# Patient Record
Sex: MALE | Race: BLACK OR AFRICAN AMERICAN | Employment: FULL TIME | URBAN - METROPOLITAN AREA
[De-identification: names, ages, dates, MRNs, and addresses within clinical notes are randomized per-mention and may not be internally consistent; named-entity substitution may affect disease eponyms.]

---

## 2022-03-09 ENCOUNTER — OFFICE VISIT (OUTPATIENT)
Dept: VASCULAR SURGERY | Age: 52
End: 2022-03-09
Payer: COMMERCIAL

## 2022-03-09 VITALS
HEART RATE: 87 BPM | OXYGEN SATURATION: 99 % | BODY MASS INDEX: 19.6 KG/M2 | SYSTOLIC BLOOD PRESSURE: 160 MMHG | HEIGHT: 71 IN | DIASTOLIC BLOOD PRESSURE: 92 MMHG | WEIGHT: 140 LBS

## 2022-03-09 DIAGNOSIS — I82.401 ACUTE THROMBOEMBOLISM OF DEEP VEINS OF RIGHT LOWER EXTREMITY (HCC): Primary | ICD-10-CM

## 2022-03-09 PROCEDURE — 99203 OFFICE O/P NEW LOW 30 MIN: CPT | Performed by: NURSE PRACTITIONER

## 2022-03-09 RX ORDER — AMLODIPINE BESYLATE 5 MG/1
5 TABLET ORAL DAILY
COMMUNITY
Start: 2022-03-04

## 2022-03-09 RX ORDER — RIVAROXABAN 20 MG/1
20 TABLET, FILM COATED ORAL DAILY
COMMUNITY
Start: 2022-03-04 | End: 2022-09-12

## 2022-03-09 RX ORDER — RIVAROXABAN 15 MG-20MG
KIT ORAL
COMMUNITY
Start: 2022-02-13 | End: 2022-09-12 | Stop reason: SDUPTHER

## 2022-03-09 NOTE — PROGRESS NOTES
Chief Complaint   Patient presents with    New Patient    Blood Clot         Impression and Plan:  47 y.o. male with an unprovoked right lower extremity DVT. Patient advised to use warm compress and ibuprofen along superficial thrombus to help alleviate discomfort. Refer to hematology as he has a DVT of unknown origin. RTO in 2 months a repeat DVT rule out PVL      History and Physical    Jossie Hawthorne is a 47y.o. 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 After failing the pain he worked a 15-hour shift standing at the [employer]. The pain went on for several days until it became unbearable where he entered the ED. The majority of his thrombus was in his greatness vein and remained superficial however while he was supine a portion of it extended past the saphenofemoral junction. It does retract with standing. But since it is close to the deep system he has been placed on Xarelto. He denies any traumatic event, injury, stretch injury, long trip and hormone therapy. He denies a family history of DVT and he denies a previous DVT. No past medical history on file. There is no problem list on file for this patient. No past surgical history on file. Current Outpatient Medications   Medication Sig Dispense Refill    amLODIPine (NORVASC) 5 mg tablet Take 5 mg by mouth daily.  Xarelto DVT-PE Treat 30d Start 15 mg (42)- 20 mg (9) DsPk TAKE BY MOUTH AS DIRECTED ON PACKAGE FOR 30 DAYS      Xarelto 20 mg tab tablet Take 20 mg by mouth daily.        Allergies   Allergen Reactions    Iodinated Contrast Media Other (comments)     Social History     Socioeconomic History    Marital status:      Spouse name: Not on file    Number of children: Not on file    Years of education: Not on file    Highest education level: Not on file   Occupational History    Not on file   Tobacco Use    Smoking status: Not on file    Smokeless tobacco: Not on file   Substance and Sexual Activity    Alcohol use: Not on file    Drug use: Not on file    Sexual activity: Not on file   Other Topics Concern    Not on file   Social History Narrative    Not on file     Social Determinants of Health     Financial Resource Strain:     Difficulty of Paying Living Expenses: Not on file   Food Insecurity:     Worried About Running Out of Food in the Last Year: Not on file    Jj of Food in the Last Year: Not on file   Transportation Needs:     Lack of Transportation (Medical): Not on file    Lack of Transportation (Non-Medical): Not on file   Physical Activity:     Days of Exercise per Week: Not on file    Minutes of Exercise per Session: Not on file   Stress:     Feeling of Stress : Not on file   Social Connections:     Frequency of Communication with Friends and Family: Not on file    Frequency of Social Gatherings with Friends and Family: Not on file    Attends [employer] Services: Not on file    Active Member of [address] [place] [address] or Organizations: Not on file    Attends Club or Organization Meetings: Not on file    Marital Status: Not on file   Intimate Partner Violence:     Fear of Current or Ex-Partner: Not on file    Emotionally Abused: Not on file    Physically Abused: Not on file    Sexually Abused: Not on file   Housing Stability:     Unable to Pay for Housing in the Last Year: Not on file    Number of [place] in the Last Year: Not on file    Unstable Housing in the Last Year: Not on file      No family history on file.     Review of Systems    General: negative for fever   Eyes: negative for vision loss   HENT: negative for cold symptoms   Respiratory negative for shortness of breath   Cardiac: negative for chest pain   Vascular negative for foot pain at night    Gastrointestinal: negative for abdominal pain   Genitourinary: negative for dysuria    Endocrine: negative for excessive thirst   Skin: negative for rash   Neurological: negative for paralysis   Psychiatric: negative for depression          Physical Exam:    Visit Vitals  Ht 5' 11\" (1.803 m)   Wt 140 lb (63.5 kg)   BMI 19.53 kg/m²      Constitutional:  Patient is well developed, well nourished, and not distressed. HEENT: atraumatic, normocephalic, wearing a mask. Eyes:   Cunjunctivae clear, no scleral icterus  Neck:   No JVD present. Cardiovascular:  Normal rate, regular rhythm, normal heart sounds. No murmur heard. Pulmonary/Chest: Effort normal .  Extremities: Normal range of motion. No edema  Neurological:  he  is alert and oriented x3 . Gait normal. Motor & sensory grossly intact in all 4 limbs. Psych: Appropriate mood and affect. Skin:  Skin is warm and dry. No ulcerations  Pulses: Palpable pedal pulses        The treatment plan was reviewed with the patient in detail. The patient voiced understanding of this plan and all questions and concerns were addressed. The patient agrees with this plan. We discussed the signs and symptoms that would require earlier attention or intervention. I appreciate the opportunity to participate in the care of your patient. I will be sure to keep you informed of any subsequent changes in the treatment plan. If you have any questions or concerns, please feel free to contact me.       [facility]  Vascular Nurse [address]  [phone]

## 2022-03-09 NOTE — PROGRESS NOTES
1. Have you been to an emergency room or urgent care clinic since your last visit? Yes, [facility]     Hospitalized since your last visit? If yes, where, when, and reason for visit? No  2. Have you seen or consulted any other health care providers outside of the [facility] since your last visit including any procedures, health maintenance items. If yes, where, when and reason for visit?  Yes

## 2022-05-11 ENCOUNTER — OFFICE VISIT (OUTPATIENT)
Dept: VASCULAR SURGERY | Age: 52
End: 2022-05-11
Payer: COMMERCIAL

## 2022-05-11 VITALS
SYSTOLIC BLOOD PRESSURE: 126 MMHG | OXYGEN SATURATION: 98 % | HEART RATE: 96 BPM | RESPIRATION RATE: 20 BRPM | DIASTOLIC BLOOD PRESSURE: 88 MMHG

## 2022-05-11 DIAGNOSIS — I82.5Y1 CHRONIC VENOUS EMBOLISM AND THROMBOSIS OF DEEP VESSELS OF PROXIMAL END OF RIGHT LOWER EXTREMITY (HCC): Primary | ICD-10-CM

## 2022-05-11 PROCEDURE — 99213 OFFICE O/P EST LOW 20 MIN: CPT | Performed by: NURSE PRACTITIONER

## 2022-05-11 NOTE — PROGRESS NOTES
Chief Complaint   Patient presents with    Blood Clot         Impression and Plan:  47 y.o. male with an unprovoked right lower extremity DVT. Pt is scheduled to see hematology next week He was advised to continue with anticoagulation until he sees a hematologist.  Lj Gonzalez as needed      History and Physical    Dora Persaud is a 47y.o. year old male  a history of alcohol induced pancreatitis ,pancreatomy and splenectomy in 2018 returns to the office for his 2 month follow up concerning an unprovoked DVT. He notes marked improvement in his right thigh and leg pain. He denies edema. He has been compliant with his anticoagulant and will follow-up with hematology next week. His venous rule out indicates a Chronic occlusive thrombus in the right great saphenous vein >5 cm from the deep system, the thrombus remains occlusive and extends to the below knee segment. There is an area of partial reconstitution at the distal thigh segment. 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 After feeling the pain he worked a 15-hour shift standing at the [employer]. The pain went on for several days until it became unbearable where he entered the ED. The majority of his thrombus was in his GSV and remained superficial however while he was supine a portion of it extended past the saphenofemoral junction. It does retract with standing. But since it is close to the deep system he has been placed on Xarelto. He denies any traumatic event, injury, stretch injury, long trip and hormone therapy. He denies a family history of DVT and he denies a previous DVT. Past Medical History:   Diagnosis Date    Hypertension     Pancreatitis     Thromboembolus ([facility] [address]      There is no problem list on file for this patient. No past surgical history on file.   Current Outpatient Medications   Medication Sig Dispense Refill    amLODIPine (NORVASC) 5 mg tablet Take 5 mg by mouth daily.  Xarelto DVT-PE Treat 30d Start 15 mg (42)- 20 mg (9) DsPk TAKE BY MOUTH AS DIRECTED ON PACKAGE FOR 30 DAYS      Xarelto 20 mg tab tablet Take 20 mg by mouth daily. Allergies   Allergen Reactions    Iodinated Contrast Media Other (comments)     Social History     Socioeconomic History    Marital status:      Spouse name: Not on file    Number of children: Not on file    Years of education: Not on file    Highest education level: Not on file   Occupational History    Not on file   Tobacco Use    Smoking status: Current Every Day Smoker     Types: Cigarettes    Smokeless tobacco: Never Used   Vaping Use    Vaping Use: Former   Substance and Sexual Activity    Alcohol use: Yes     Alcohol/week: 14.0 standard drinks     Types: 14 Shots of liquor per week    Drug use: Never    Sexual activity: Not on file   Other Topics Concern    Not on file   Social History Narrative    Not on file     Social Determinants of Health     Financial Resource Strain:     Difficulty of Paying Living Expenses: Not on file   Food Insecurity:     Worried About Running Out of Food in the Last Year: Not on file    Jj of Food in the Last Year: Not on file   Transportation Needs:     Lack of Transportation (Medical): Not on file    Lack of Transportation (Non-Medical):  Not on file   Physical Activity:     Days of Exercise per Week: Not on file    Minutes of Exercise per Session: Not on file   Stress:     Feeling of Stress : Not on file   Social Connections:     Frequency of Communication with Friends and Family: Not on file    Frequency of Social Gatherings with Friends and Family: Not on file    Attends [employer] Services: Not on file    Active Member of Clubs or Organizations: Not on file    Attends Club or Organization Meetings: Not on file    Marital Status: Not on file   Intimate Partner Violence:     Fear of Current or Ex-Partner: Not on file  Emotionally Abused: Not on file    Physically Abused: Not on file    Sexually Abused: Not on file   Housing Stability:     Unable to Pay for Housing in the Last Year: Not on file    Number of Places Lived in the Last Year: Not on file    Unstable Housing in the Last Year: Not on file      Family History   Problem Relation Age of Onset    Cancer Father     Diabetes Maternal Uncle        Review of Systems    General: negative for fever   Eyes: negative for vision loss   HENT: negative for cold symptoms   Respiratory negative for shortness of breath   Cardiac: negative for chest pain   Vascular negative for foot pain at night    Gastrointestinal: negative for abdominal pain   Genitourinary: negative for dysuria    Endocrine: negative for excessive thirst   Skin: negative for rash   Neurological: negative for paralysis   Psychiatric: negative for depression          Physical Exam:    There were no vitals taken for this visit. Constitutional:  Patient is well developed, well nourished, and not distressed. HEENT: atraumatic, normocephalic, wearing a mask. Eyes:   Cunjunctivae clear, no scleral icterus  Neck:   No JVD present. Cardiovascular:  Normal rate, regular rhythm, normal heart sounds. No murmur heard. Pulmonary/Chest: Effort normal .  Extremities: Normal range of motion. No edema  Neurological:  he  is alert and oriented x3 . Gait normal. Motor & sensory grossly intact in all 4 limbs. Psych: Appropriate mood and affect. Skin:  Skin is warm and dry. No ulcerations  Pulses: Palpable pedal pulses        The treatment plan was reviewed with the patient in detail. The patient voiced understanding of this plan and all questions and concerns were addressed. The patient agrees with this plan. We discussed the signs and symptoms that would require earlier attention or intervention. I appreciate the opportunity to participate in the care of your patient.   I will be sure to keep you informed of any subsequent changes in the treatment plan. If you have any questions or concerns, please feel free to contact me.       Randy Cheng [facility]  Vascular Nurse [address]  [phone]

## 2022-05-11 NOTE — PROGRESS NOTES
1. Have you been to an emergency room or urgent care clinic since your last visit? No     Hospitalized since your last visit? If yes, where, when, and reason for visit? No     2. Have you seen or consulted any other health care providers outside of the [facility] since your last visit including any procedures, health maintenance items. If yes, where, when and reason for visit?  Yes ; pcp         3 most recent PHQ Screens 5/11/2022   Little interest or pleasure in doing things Not at all   Feeling down, depressed, irritable, or hopeless Not at all   Total Score PHQ 2 0

## 2022-05-17 ENCOUNTER — OFFICE VISIT (OUTPATIENT)
Dept: ONCOLOGY | Age: 52
End: 2022-05-17
Payer: COMMERCIAL

## 2022-05-17 VITALS
OXYGEN SATURATION: 100 % | WEIGHT: 136 LBS | HEIGHT: 71 IN | SYSTOLIC BLOOD PRESSURE: 155 MMHG | DIASTOLIC BLOOD PRESSURE: 117 MMHG | BODY MASS INDEX: 19.04 KG/M2 | RESPIRATION RATE: 18 BRPM | HEART RATE: 76 BPM

## 2022-05-17 DIAGNOSIS — D75.838 THROMBOCYTOSIS AFTER SPLENECTOMY: ICD-10-CM

## 2022-05-17 DIAGNOSIS — I82.401 ACUTE DEEP VEIN THROMBOSIS (DVT) OF RIGHT LOWER EXTREMITY, UNSPECIFIED VEIN (HCC): Primary | ICD-10-CM

## 2022-05-17 DIAGNOSIS — Z90.81 THROMBOCYTOSIS AFTER SPLENECTOMY: ICD-10-CM

## 2022-05-17 PROCEDURE — 99204 OFFICE O/P NEW MOD 45 MIN: CPT | Performed by: INTERNAL MEDICINE

## 2022-05-17 RX ORDER — DOCUSATE SODIUM 100 MG/1
100 CAPSULE, LIQUID FILLED ORAL DAILY PRN
COMMUNITY

## 2022-05-17 RX ORDER — SILDENAFIL 100 MG/1
TABLET, FILM COATED ORAL
COMMUNITY
Start: 2022-03-04

## 2022-05-17 NOTE — PROGRESS NOTES
Hematology/Oncology Consultation Note      Date: 2022    Name: Melchor Loza  : 1970        Annalee Hastings MD         Subjective:     Chief complaint: Right Lower extremity DVT    History of Present Illness:   Mr. Nany Post is a most pleasant 47y.o. year old male who was seen for consultation of Right Lower extremity DVT. The patient has a past medical history significant of alcohol induced pancreatitis, pancreatomy and splenectomy in 2018. 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 The pain went on for several days until it became unbearable where he entered the ED.   -- 2022 Venous doppler reported acute, non-occlusive deep venous thrombosis in the right common femoral vein at the saphenofemoral junction extending from the great saphenous vein. Acute, occlusive superficial venous thrombosis in the right great saphenous vein at the level of the saphenofemoral junction to the mid calf. -- The majority of his thrombus was in his GSV and remained superficial however while he was supine a portion of it extended past the saphenofemoral junction. It does retract with standing. But since it is close to the deep system he has been placed on Xarelto.   The patient denied any traumatic event, injury, stretch injury, long trip and hormone therapy. He denies a family history of DVT and he denies a previous DVT. -- 2022 Venous Doppler reported Chronic occlusive thrombus in the right great saphenous vein >5 cm from the deep system, the thrombus remains occlusive and extends to the below knee segment. There is an area of partial reconstitution at the distal thigh segment. -- The patient reported significant improvement in his right thigh and leg pain. He denies edema. He has been compliant with his anticoagulant Xarelto. The patient otherwise has no other complaints.  Denied fever, chills, night sweat, unintentional weight loss, skin lumps or bumps, acute bleeding or bruising issues. No acute bleeding, blood in stool, dark stool, melena, hematochezia, hemoptysis, dark urine, or easily bruising. Denied headache, acute vision change, dizziness, chest pain, worsen shortness of breath, palpitation, productive cough, nausea, vomiting, abdominal pain, altered bowel habits, dysuria, worsen bone pain or back pain, new focal numbness or weakness. Past Medical History, Family History, and Social History:    Past Medical History:   Diagnosis Date    Hypertension     Pancreatitis     Thromboembolus ([facility] [address]      Past Surgical History:   Procedure Laterality Date    HX APPENDECTOMY      HX GI       Social History     Socioeconomic History    Marital status:      Spouse name: Not on file    Number of children: Not on file    Years of education: Not on file    Highest education level: Not on file   Occupational History    Not on file   Tobacco Use    Smoking status: Current Every Day Smoker     Years: 6.00     Types: Cigarettes    Smokeless tobacco: Never Used   Vaping Use    Vaping Use: Former   Substance and Sexual Activity    Alcohol use: Yes     Alcohol/week: 14.0 standard drinks     Types: 14 Shots of liquor per week    Drug use: Never    Sexual activity: Not on file   Other Topics Concern    Not on file   Social History Narrative    Not on file     Social Determinants of Health     Financial Resource Strain:     Difficulty of Paying Living Expenses: Not on file   Food Insecurity:     Worried About Running Out of Food in the Last Year: Not on file    Jj of Food in the Last Year: Not on file   Transportation Needs:     Lack of Transportation (Medical): Not on file    Lack of Transportation (Non-Medical):  Not on file   Physical Activity:     Days of Exercise per Week: Not on file    Minutes of Exercise per Session: Not on file   Stress:     Feeling of Stress : Not on file   Social Connections:     Frequency of Communication with Friends and Family: Not on file    Frequency of Social Gatherings with Friends and Family: Not on file    Attends Religion Services: Not on file    Active Member of Clubs or Organizations: Not on file    Attends Club or Organization Meetings: Not on file    Marital Status: Not on file   Intimate Partner Violence:     Fear of Current or Ex-Partner: Not on file    Emotionally Abused: Not on file    Physically Abused: Not on file    Sexually Abused: Not on file   Housing Stability:     Unable to Pay for Housing in the Last Year: Not on file    Number of [place] in the Last Year: Not on file    Unstable Housing in the Last Year: Not on file     Family History   Problem Relation Age of Onset    Cancer Father     Diabetes Maternal Uncle      Current Outpatient Medications   Medication Sig Dispense Refill    sildenafil citrate (VIAGRA) 100 mg tablet TAKE 1 TABLET BY MOUTH 1 HOUR PRIOR TO INTERCOURSE      docusate sodium (COLACE) 100 mg capsule Take 100 mg by mouth daily as needed.  amLODIPine (NORVASC) 5 mg tablet Take 5 mg by mouth daily.  Xarelto DVT-PE Treat 30d Start 15 mg (42)- 20 mg (9) DsPk TAKE BY MOUTH AS DIRECTED ON PACKAGE FOR 30 DAYS      Xarelto 20 mg tab tablet Take 20 mg by mouth daily. Review of Systems   Constitutional: Negative for chills, diaphoresis, fever, malaise/fatigue and weight loss. Respiratory: Negative for cough, hemoptysis, shortness of breath and wheezing. Cardiovascular: Negative for chest pain, palpitations and leg swelling. Gastrointestinal: Negative for abdominal pain, diarrhea, heartburn, nausea and vomiting. Genitourinary: Negative for dysuria, frequency, hematuria and urgency. Musculoskeletal: Negative for joint pain and myalgias. Skin: Negative for itching and rash. Neurological: Negative for dizziness, seizures, weakness and headaches. Psychiatric/Behavioral: Negative for depression.  The patient does not have insomnia. Objective:     Visit Vitals  BP (!) 155/117   Pulse 76   Resp 18   Ht 5' 11\" (1.803 m)   Wt 61.7 kg (136 lb)   SpO2 100%   BMI 18.97 kg/m²       ECOG Performance Status (grade): 0  0 - able to carry on all pre-disease activity w/out restriction  1 - restricted but able to carry out light work  2 - ambulatory and can self- care but unable to carry out work  3 - bed or chair >50% of waking hours  4 - completely disable, total care, confined to bed or chair    Physical Exam  Constitutional:       General: He is not in acute distress. HENT:      Head: Normocephalic and atraumatic. Eyes:      Pupils: Pupils are equal, round, and reactive to light. Cardiovascular:      Pulses: Normal pulses. Heart sounds: Normal heart sounds. No murmur heard. Pulmonary:      Effort: Pulmonary effort is normal. No respiratory distress. Breath sounds: Normal breath sounds. Abdominal:      General: Bowel sounds are normal. There is no distension. Palpations: Abdomen is soft. There is no mass. Tenderness: There is no abdominal tenderness. There is no guarding. Musculoskeletal:         General: No swelling or tenderness. Cervical back: Neck supple. No rigidity. Lymphadenopathy:      Cervical: No cervical adenopathy. Skin:     General: Skin is warm. Findings: No rash. Neurological:      Mental Status: He is alert and oriented to person, place, and time. Mental status is at baseline. Cranial Nerves: No cranial nerve deficit. Psychiatric:         Mood and Affect: Mood normal.          Diagnostics:      No results found for this or any previous visit (from the past 96 hour(s)). Imaging:  No results found for this or any previous visit. No results found for this or any previous visit. No results found for this or any previous visit.         Pathology          Assessment:                                        1. Acute deep vein thrombosis (DVT) of right lower extremity, unspecified vein ([facility] [address]    2. Thrombocytosis after splenectomy        Plan:                                        # Right Lower extremity DVT. # S.p Splenectomy  -- Past medical history significant of alcohol induced pancreatitis, pancreatomy and splenectomy in 2018. 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 The pain went on for several days until it became unbearable where he entered the ED.   -- 2/12/2022 Venous doppler reported acute, non-occlusive deep venous thrombosis in the right common femoral vein at the saphenofemoral junction extending from the great saphenous vein. Acute, occlusive superficial venous thrombosis in the right great saphenous vein at the level of the saphenofemoral junction to the mid calf. -- The majority of his thrombus was in his GSV and remained superficial however while he was supine a portion of it extended past the saphenofemoral junction. It does retract with standing. But since it is close to the deep system he has been placed on Xarelto.   The patient denied any traumatic event, injury, stretch injury, long trip and hormone therapy. He denies a family history of DVT and he denies a previous DVT. -- 5/11/2022 Venous Doppler reported Chronic occlusive thrombus in the right great saphenous vein >5 cm from the deep system, the thrombus remains occlusive and extends to the below knee segment. There is an area of partial reconstitution at the distal thigh segment. -- The patient reported significant improvement in his right thigh and leg pain. He denies edema. He has been compliant with his anticoagulant Xarelto. -- Today I have reviewed with the patient about the diagnosis and natural history of the disease. I have explained to the patient that his VTE appeared related to hx splenectomy.  Reports showed an increased risk of vascular complications involving both the venous and the arterial sides of the circulation may result from splenectomy. Plan:  -- Given hx splenectomy with VTE, the patient will likely benefit from long-term anticoagulation to prevent further VTEs. He is presently taking Xarelto. -- Will repeat D-dimer and Venous study in 3 months for reassessment. -- He will f/u PCP for life style modifications, smoking cessation. # History of Iron deficiency  # Thrombocytosis s.p Splenectomy  -- 5/2018 PLTs 961K.  -- Will obtain CBC with diff, Iron profile, ferritin, JAK2  -- We will see the patient back in about 2-3 weeks. Always sooner if required. Orders Placed This Encounter    METABOLIC PANEL, COMPREHENSIVE     Standing Status:   Future     Standing Expiration Date:   5/18/2023    IRON PROFILE     Standing Status:   Future     Standing Expiration Date:   5/18/2023    FERRITIN     Standing Status:   Future     Standing Expiration Date:   5/17/2023    CBC WITH AUTOMATED DIFF     Standing Status:   Future     Standing Expiration Date:   5/17/2023    JAK2 MUTATION ANALYSIS     Standing Status:   Future     Standing Expiration Date:   5/17/2023    sildenafil citrate (VIAGRA) 100 mg tablet     Sig: TAKE 1 TABLET BY MOUTH 1 HOUR PRIOR TO INTERCOURSE    docusate sodium (COLACE) 100 mg capsule     Sig: Take 100 mg by mouth daily as needed. Mr. Brandy Reyes has a reminder for a \"due or due soon\" health maintenance. I have asked that he contact his primary care provider for follow-up on this health maintenance. All of patient's questions answered to their apparent satisfaction. They verbally show understanding and agreement with aforementioned plan. Bonita Mahoney MD  5/17/2022        Above mentioned total time spent for this encounter with more than 50% of the time spent in face-to-face counseling, discussing on diagnosis and management plan going forward, and co-ordination of care.   Parts of this document has been produced using Dragon dictation system. Unrecognized errors in transcription may be present. Please do not hesitate to reach out for any questions or clarifications.         CC: Liz Augustin MD

## 2022-05-23 LAB
ALB/GLOBRATIO, 58C: 1.5 (CALC) (ref 1–2.5)
ALBUMIN SERPL-MCNC: 4.9 G/DL (ref 3.6–5.1)
ALKALINE PHOSPHATASE, TOTAL, 25002000: 67 U/L (ref 35–144)
ALT SERPL-CCNC: 20 U/L (ref 9–46)
ASSAY DETAILS: NORMAL
AST SERPL W P-5'-P-CCNC: 50 U/L (ref 10–35)
BASOPHILS # BLD: 64 CELLS/UL (ref 0–200)
BASOPHILS NFR BLD: 0.8 %
BILIRUB SERPL-MCNC: 0.6 MG/DL (ref 0.2–1.2)
BLOCK/SPECIMEN ID: NORMAL
BUN SERPL-MCNC: 8 MG/DL (ref 7–25)
BUN/CREATININE RATIO,BUCR: ABNORMAL (CALC) (ref 6–22)
CALCIUM SERPL-MCNC: 9.8 MG/DL (ref 8.6–10.3)
CHLORIDE SERPL-SCNC: 98 MMOL/L (ref 98–110)
CO2 SERPL-SCNC: 23 MMOL/L (ref 20–32)
CREAT SERPL-MCNC: 0.81 MG/DL (ref 0.7–1.33)
EOSINOPHIL # BLD: 48 CELLS/UL (ref 15–500)
EOSINOPHIL NFR BLD: 0.6 %
ERYTHROCYTE [DISTWIDTH] IN BLOOD BY AUTOMATED COUNT: 20.7 % (ref 11–15)
FERRITIN SERPL-MCNC: 537 NG/ML (ref 38–380)
GLOBULIN,GLOB: 3.2 G/DL (CALC) (ref 1.9–3.7)
GLUCOSE SERPL-MCNC: 103 MG/DL (ref 65–99)
HCT VFR BLD AUTO: 33.8 % (ref 38.5–50)
HGB BLD-MCNC: 11 G/DL (ref 13.2–17.1)
IRON,IRN: 321 MCG/DL (ref 50–180)
JAK2 V617F MUTATION: NOT DETECTED
LYMPHOCYTES # BLD: 2896 CELLS/UL (ref 850–3900)
LYMPHOCYTES NFR BLD: 36.2 %
Lab: NORMAL
Lab: NORMAL
MCH RBC QN AUTO: 27.2 PG (ref 27–33)
MCHC RBC AUTO-ENTMCNC: 32.5 G/DL (ref 32–36)
MCV RBC AUTO: 83.5 FL (ref 80–100)
MONOCYTES # BLD: 760 CELLS/UL (ref 200–950)
MONOCYTES NFR BLD: 9.5 %
NEUTROPHILS # BLD AUTO: 4232 CELLS/UL (ref 1500–7800)
NEUTROPHILS # BLD: 52.9 %
PLATELET # BLD AUTO: 327 THOUSAND/UL (ref 140–400)
PMV BLD AUTO: 10.1 FL (ref 7.5–12.5)
POTASSIUM SERPL-SCNC: 3.9 MMOL/L (ref 3.5–5.3)
PROT SERPL-MCNC: 8.1 G/DL (ref 6.1–8.1)
RBC # BLD AUTO: 4.05 MILLION/UL (ref 4.2–5.8)
SODIUM SERPL-SCNC: 135 MMOL/L (ref 135–146)
SPECIMEN SOURCE, M86007404: NORMAL
WBC # BLD AUTO: 8 THOUSAND/UL (ref 3.8–10.8)

## 2022-06-08 ENCOUNTER — OFFICE VISIT (OUTPATIENT)
Dept: ONCOLOGY | Age: 52
End: 2022-06-08
Payer: COMMERCIAL

## 2022-06-08 VITALS
DIASTOLIC BLOOD PRESSURE: 97 MMHG | WEIGHT: 134 LBS | HEIGHT: 71 IN | OXYGEN SATURATION: 100 % | RESPIRATION RATE: 18 BRPM | SYSTOLIC BLOOD PRESSURE: 138 MMHG | BODY MASS INDEX: 18.76 KG/M2 | HEART RATE: 84 BPM

## 2022-06-08 DIAGNOSIS — D75.838 THROMBOCYTOSIS AFTER SPLENECTOMY: Primary | ICD-10-CM

## 2022-06-08 DIAGNOSIS — Z90.81 THROMBOCYTOSIS AFTER SPLENECTOMY: Primary | ICD-10-CM

## 2022-06-08 DIAGNOSIS — I82.401 ACUTE DEEP VEIN THROMBOSIS (DVT) OF RIGHT LOWER EXTREMITY, UNSPECIFIED VEIN (HCC): ICD-10-CM

## 2022-06-08 PROCEDURE — 99214 OFFICE O/P EST MOD 30 MIN: CPT | Performed by: INTERNAL MEDICINE

## 2022-06-08 NOTE — PROGRESS NOTES
Hematology/Oncology Note      Date: 2022    Name: David Gregorio  : 1970        Michelle Ambrose MD         Subjective:     Chief complaint: Right Lower extremity DVT    History of Present Illness:   Mr. Clarence Torres is a most pleasant 47y.o. year old male who was seen for consultation of Right Lower extremity DVT. The patient has a past medical history significant of alcohol induced pancreatitis, pancreatomy and splenectomy in 2018. 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 The pain went on for several days until it became unbearable where he entered the ED.   -- 2022 Venous doppler reported acute, non-occlusive deep venous thrombosis in the right common femoral vein at the saphenofemoral junction extending from the great saphenous vein. Acute, occlusive superficial venous thrombosis in the right great saphenous vein at the level of the saphenofemoral junction to the mid calf. -- The majority of his thrombus was in his GSV and remained superficial however while he was supine a portion of it extended past the saphenofemoral junction. It does retract with standing. But since it is close to the deep system he has been placed on Xarelto.   The patient denied any traumatic event, injury, stretch injury, long trip and hormone therapy. He denies a family history of DVT and he denies a previous DVT. -- 2022 Venous Doppler reported Chronic occlusive thrombus in the right great saphenous vein >5 cm from the deep system, the thrombus remains occlusive and extends to the below knee segment. There is an area of partial reconstitution at the distal thigh segment. -- The patient reported significant improvement in his right thigh and leg pain. He denies edema. He has been compliant with his anticoagulant Xarelto. Today he has no other complaints.  Denied fever, chills, night sweat, unintentional weight loss, skin lumps or bumps, acute bleeding or bruising issues. Denied headache, acute vision change, dizziness, chest pain, worsen shortness of breath, palpitation, productive cough, nausea, vomiting, abdominal pain, altered bowel habits, dysuria, worsen bone pain or back pain, new focal numbness or weakness. Past Medical History, Family History, and Social History:    Past Medical History:   Diagnosis Date    Hypertension     Pancreatitis     Thromboembolus ([address]      Past Surgical History:   Procedure Laterality Date    HX APPENDECTOMY      HX GI       Social History     Socioeconomic History    Marital status:      Spouse name: Not on file    Number of children: Not on file    Years of education: Not on file    Highest education level: Not on file   Occupational History    Not on file   Tobacco Use    Smoking status: Current Every Day Smoker     Years: 6.00     Types: Cigarettes    Smokeless tobacco: Never Used   Vaping Use    Vaping Use: Former   Substance and Sexual Activity    Alcohol use: Yes     Alcohol/week: 14.0 standard drinks     Types: 14 Shots of liquor per week    Drug use: Never    Sexual activity: Not on file   Other Topics Concern    Not on file   Social History Narrative    Not on file     Social Determinants of Health     Financial Resource Strain:     Difficulty of Paying Living Expenses: Not on file   Food Insecurity:     Worried About Running Out of Food in the Last Year: Not on file    Jj of Food in the Last Year: Not on file   Transportation Needs:     Lack of Transportation (Medical): Not on file    Lack of Transportation (Non-Medical):  Not on file   Physical Activity:     Days of Exercise per Week: Not on file    Minutes of Exercise per Session: Not on file   Stress:     Feeling of Stress : Not on file   Social Connections:     Frequency of Communication with Friends and Family: Not on file    Frequency of Social Gatherings with Friends and Family: Not on file   Shaw Attends Cheondoism Services: Not on file    Active Member of Clubs or Organizations: Not on file    Attends Club or Organization Meetings: Not on file    Marital Status: Not on file   Intimate Partner Violence:     Fear of Current or Ex-Partner: Not on file    Emotionally Abused: Not on file    Physically Abused: Not on file    Sexually Abused: Not on file   Housing Stability:     Unable to Pay for Housing in the Last Year: Not on file    Number of [place] in the Last Year: Not on file    Unstable Housing in the Last Year: Not on file     Family History   Problem Relation Age of Onset    Cancer Father     Diabetes Maternal Uncle      Current Outpatient Medications   Medication Sig Dispense Refill    sildenafil citrate (VIAGRA) 100 mg tablet TAKE 1 TABLET BY MOUTH 1 HOUR PRIOR TO INTERCOURSE      docusate sodium (COLACE) 100 mg capsule Take 100 mg by mouth daily as needed.  amLODIPine (NORVASC) 5 mg tablet Take 5 mg by mouth daily.  Xarelto DVT-PE Treat 30d Start 15 mg (42)- 20 mg (9) DsPk TAKE BY MOUTH AS DIRECTED ON PACKAGE FOR 30 DAYS      Xarelto 20 mg tab tablet Take 20 mg by mouth daily. Review of Systems   Constitutional: Negative for chills, diaphoresis, fever, malaise/fatigue and weight loss. Respiratory: Negative for cough, hemoptysis, shortness of breath and wheezing. Cardiovascular: Negative for chest pain, palpitations and leg swelling. Gastrointestinal: Negative for abdominal pain, diarrhea, heartburn, nausea and vomiting. Genitourinary: Negative for dysuria, frequency, hematuria and urgency. Musculoskeletal: Negative for joint pain and myalgias. Skin: Negative for itching and rash. Neurological: Negative for dizziness, seizures, weakness and headaches. Psychiatric/Behavioral: Negative for depression. The patient does not have insomnia.              Objective:     Visit Vitals  BP (!) 138/97   Pulse 84   Resp 18   Ht 5' 11\" (1.803 m)   Wt 60.8 kg (134 lb)   SpO2 100%   BMI 18.69 kg/m²       ECOG Performance Status (grade): 0  0 - able to carry on all pre-disease activity w/out restriction  1 - restricted but able to carry out light work  2 - ambulatory and can self- care but unable to carry out work  3 - bed or chair >50% of waking hours  4 - completely disable, total care, confined to bed or chair    Physical Exam  Constitutional:       General: He is not in acute distress. HENT:      Head: Normocephalic and atraumatic. Eyes:      Pupils: Pupils are equal, round, and reactive to light. Cardiovascular:      Pulses: Normal pulses. Heart sounds: Normal heart sounds. No murmur heard. Pulmonary:      Effort: Pulmonary effort is normal. No respiratory distress. Breath sounds: Normal breath sounds. Abdominal:      General: Bowel sounds are normal. There is no distension. Palpations: Abdomen is soft. There is no mass. Tenderness: There is no abdominal tenderness. There is no guarding. Musculoskeletal:         General: No swelling or tenderness. Cervical back: Neck supple. No rigidity. Lymphadenopathy:      Cervical: No cervical adenopathy. Skin:     General: Skin is warm. Findings: No rash. Neurological:      Mental Status: He is alert and oriented to person, place, and time. Mental status is at baseline. Cranial Nerves: No cranial nerve deficit. Psychiatric:         Mood and Affect: Mood normal.          Diagnostics:      No results found for this or any previous visit (from the past 96 hour(s)). Imaging:  No results found for this or any previous visit. No results found for this or any previous visit. No results found for this or any previous visit. Pathology          Assessment:                                        1. Thrombocytosis after splenectomy    2.  Acute deep vein thrombosis (DVT) of right lower extremity, unspecified vein ([facility])        Plan:                                        # Right Lower extremity DVT. # S.p Splenectomy  -- Past medical history significant of alcohol induced pancreatitis, pancreatomy and splenectomy in 2018. 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 The pain went on for several days until it became unbearable where he entered the ED.   -- 2/12/2022 Venous doppler reported acute, non-occlusive deep venous thrombosis in the right common femoral vein at the saphenofemoral junction extending from the great saphenous vein. Acute, occlusive superficial venous thrombosis in the right great saphenous vein at the level of the saphenofemoral junction to the mid calf. -- The majority of his thrombus was in his GSV and remained superficial however while he was supine a portion of it extended past the saphenofemoral junction. It does retract with standing. But since it is close to the deep system he has been placed on Xarelto.   The patient denied any traumatic event, injury, stretch injury, long trip and hormone therapy. He denies a family history of DVT and he denies a previous DVT. -- 5/11/2022 Venous Doppler reported Chronic occlusive thrombus in the right great saphenous vein >5 cm from the deep system, the thrombus remains occlusive and extends to the below knee segment. There is an area of partial reconstitution at the distal thigh segment. -- The patient reported significant improvement in his right thigh and leg pain. He denies edema. He has been compliant with his anticoagulant Xarelto. Plan:  -- Given hx splenectomy with VTE, the patient will likely benefit from long-term anticoagulation to prevent further VTEs. He is presently taking Xarelto. Tolerate it well. -- Will repeat D-dimer and Venous study in 3 months for reassessment. Will consider reduced intensity dosing for VTE prophylaxis. -- He will f/u PCP for life style modifications, smoking cessation.    -- We will see the patient back in about 3 months. Always sooner if required. # History of Iron deficiency  # Thrombocytosis s.p Splenectomy  -- 5/2018 PLTs 961K.  -- Today I have reviewed with the patient about recent lab reports. -- 5/7/2022 PLTs improving 327K. Iron 321, Ferritin 537. Negative JAK2 V617F mutation. Orders Placed This Encounter    METABOLIC PANEL, COMPREHENSIVE     Standing Status:   Future     Standing Expiration Date:   6/9/2023    CBC WITH AUTOMATED DIFF     Standing Status:   Future     Standing Expiration Date:   6/9/2023    D DIMER     Standing Status:   Future     Standing Expiration Date:   6/9/2023    DUPLEX LOWER EXT VENOUS RIGHT     Standing Status:   Future     Standing Expiration Date:   12/8/2022     Scheduling Instructions:      3 months-September 2022           Mr. Sandy Benoit has a reminder for a \"due or due soon\" health maintenance. I have asked that he contact his primary care provider for follow-up on this health maintenance. All of patient's questions answered to their apparent satisfaction. They verbally show understanding and agreement with aforementioned plan. Kala Quezada MD  6/8/2022        Above mentioned total time spent for this encounter with more than 50% of the time spent in face-to-face counseling, discussing on diagnosis and management plan going forward, and co-ordination of care. Parts of this document has been produced using Dragon dictation system. Unrecognized errors in transcription may be present. Please do not hesitate to reach out for any questions or clarifications.         CC: Violeat Espinoza MD

## 2022-08-29 ENCOUNTER — APPOINTMENT (OUTPATIENT)
Dept: ONCOLOGY | Age: 52
End: 2022-08-29

## 2022-08-30 LAB
ALBUMIN SERPL-MCNC: 5 G/DL (ref 3.8–4.9)
ALBUMIN/GLOB SERPL: 1.7 {RATIO} (ref 1.2–2.2)
ALP SERPL-CCNC: 66 IU/L (ref 44–121)
ALT SERPL-CCNC: 19 IU/L (ref 0–44)
AST SERPL-CCNC: 47 IU/L (ref 0–40)
BASOPHILS # BLD AUTO: 0.1 X10E3/UL (ref 0–0.2)
BASOPHILS NFR BLD AUTO: 1 %
BILIRUB SERPL-MCNC: 0.7 MG/DL (ref 0–1.2)
BUN SERPL-MCNC: 9 MG/DL (ref 6–24)
BUN/CREAT SERPL: 10 (ref 9–20)
CALCIUM SERPL-MCNC: 9.8 MG/DL (ref 8.7–10.2)
CHLORIDE SERPL-SCNC: 100 MMOL/L (ref 96–106)
CO2 SERPL-SCNC: 25 MMOL/L (ref 20–29)
CREAT SERPL-MCNC: 0.86 MG/DL (ref 0.76–1.27)
D DIMER PPP FEU-MCNC: 0.39 MG/L FEU (ref 0–0.49)
EGFR: 104 ML/MIN/1.73
EOSINOPHIL # BLD AUTO: 0.1 X10E3/UL (ref 0–0.4)
EOSINOPHIL NFR BLD AUTO: 1 %
ERYTHROCYTE [DISTWIDTH] IN BLOOD BY AUTOMATED COUNT: 19 % (ref 11.6–15.4)
GLOBULIN SER CALC-MCNC: 2.9 G/DL (ref 1.5–4.5)
GLUCOSE SERPL-MCNC: 87 MG/DL (ref 65–99)
HCT VFR BLD AUTO: 37.1 % (ref 37.5–51)
HGB BLD-MCNC: 11.7 G/DL (ref 13–17.7)
IMM GRANULOCYTES # BLD AUTO: 0 X10E3/UL (ref 0–0.1)
IMM GRANULOCYTES NFR BLD AUTO: 1 %
LYMPHOCYTES # BLD AUTO: 1.9 X10E3/UL (ref 0.7–3.1)
LYMPHOCYTES NFR BLD AUTO: 35 %
MCH RBC QN AUTO: 27.2 PG (ref 26.6–33)
MCHC RBC AUTO-ENTMCNC: 31.5 G/DL (ref 31.5–35.7)
MCV RBC AUTO: 86 FL (ref 79–97)
MONOCYTES # BLD AUTO: 0.8 X10E3/UL (ref 0.1–0.9)
MONOCYTES NFR BLD AUTO: 15 %
NEUTROPHILS # BLD AUTO: 2.5 X10E3/UL (ref 1.4–7)
NEUTROPHILS NFR BLD AUTO: 47 %
NRBC BLD AUTO-RTO: 9 % (ref 0–0)
PLATELET # BLD AUTO: 335 X10E3/UL (ref 150–450)
POTASSIUM SERPL-SCNC: 4.5 MMOL/L (ref 3.5–5.2)
PROT SERPL-MCNC: 7.9 G/DL (ref 6–8.5)
RBC # BLD AUTO: 4.3 X10E6/UL (ref 4.14–5.8)
SODIUM SERPL-SCNC: 141 MMOL/L (ref 134–144)
WBC # BLD AUTO: 5.4 X10E3/UL (ref 3.4–10.8)

## 2022-09-08 ENCOUNTER — HOSPITAL ENCOUNTER (OUTPATIENT)
Dept: VASCULAR SURGERY | Age: 52
Discharge: HOME | End: 2022-09-08
Attending: INTERNAL MEDICINE
Payer: COMMERCIAL

## 2022-09-08 DIAGNOSIS — I82.401 ACUTE DEEP VEIN THROMBOSIS (DVT) OF RIGHT LOWER EXTREMITY, UNSPECIFIED VEIN (HCC): ICD-10-CM

## 2022-09-08 DIAGNOSIS — Z90.81 THROMBOCYTOSIS AFTER SPLENECTOMY: ICD-10-CM

## 2022-09-08 DIAGNOSIS — D75.838 THROMBOCYTOSIS AFTER SPLENECTOMY: ICD-10-CM

## 2022-09-08 PROCEDURE — 93971 EXTREMITY STUDY: CPT

## 2022-09-12 ENCOUNTER — VIRTUAL VISIT (OUTPATIENT)
Dept: ONCOLOGY | Age: 52
End: 2022-09-12
Payer: COMMERCIAL

## 2022-09-12 DIAGNOSIS — D75.838 THROMBOCYTOSIS AFTER SPLENECTOMY: Primary | ICD-10-CM

## 2022-09-12 DIAGNOSIS — Z90.81 THROMBOCYTOSIS AFTER SPLENECTOMY: Primary | ICD-10-CM

## 2022-09-12 DIAGNOSIS — I82.401 ACUTE DEEP VEIN THROMBOSIS (DVT) OF RIGHT LOWER EXTREMITY, UNSPECIFIED VEIN (HCC): ICD-10-CM

## 2022-09-12 PROCEDURE — 99213 OFFICE O/P EST LOW 20 MIN: CPT | Performed by: NURSE PRACTITIONER

## 2022-09-12 NOTE — PROGRESS NOTES
Speedy Torrez is a 47 y.o. male, evaluated via audio-only technology on 9/12/2022 for Follow-up  . Assessment & Plan:   # Right Lower extremity DVT. # S.p Splenectomy  -- Past medical history significant of alcohol induced pancreatitis, pancreatomy and splenectomy in 2018. 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 The pain went on for several days until it became unbearable where he entered the ED.   -- 2/12/2022 Venous doppler reported acute, non-occlusive deep venous thrombosis in the right common femoral vein at the saphenofemoral junction extending from the great saphenous vein. Acute, occlusive superficial venous thrombosis in the right great saphenous vein at the level of the saphenofemoral junction to the mid calf. -- The majority of his thrombus was in his GSV and remained superficial however while he was supine a portion of it extended past the saphenofemoral junction. It does retract with standing. But since it is close to the deep system he has been placed on Xarelto. The patient denied any traumatic event, injury, stretch injury, long trip and hormone therapy. He denies a family history of DVT and he denies a previous DVT. -- 5/11/2022 Venous Doppler reported Chronic occlusive thrombus in the right great saphenous vein >5 cm from the deep system, the thrombus remains occlusive and extends to the below knee segment. There is an area of partial reconstitution at the distal thigh segment. -- The patient reported significant improvement in his right thigh and leg pain. He denies edema. He has been compliant with his anticoagulant Xarelto. Plan:  -- Given hx splenectomy with VTE, the patient will likely benefit from long-term anticoagulation to prevent further VTEs. He is presently taking Xarelto. Tolerate it well.    -- Recent repeat D-dimer  was normal at  0.39   --- Venous study on 9/8/2022   Known (5/12/2022) chronic superficial thrombophlebitis noted within the right great saphenous vein >5 cm from the deep system. No evidence of deep vein thrombosis within the right lower extremity. -- He has been advise to start taking Xarelto 10 mg daily after completing the 20 mg of Xarelto. Patient has agreed with the plan. --- He will f/u PCP for life style modifications, smoking cessation. -- We will see the patient back in about 4 months. Always sooner if required. # History of Iron deficiency  # Thrombocytosis s.p Splenectomy  -- 8/29/2022  PLTs 335K. -- Today I have reviewed with the patient about recent lab reports. -- 5/7/2022  Negative JAK2 V617F mutation. Subjective:   History of Present Illness:   Mr. Dimitri Clifford is a most pleasant 47y.o. year old male who was seen for management of Right Lower extremity DVT. The patient has a past medical history significant of alcohol induced pancreatitis, pancreatomy and splenectomy in 2018. 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 The pain went on for several days until it became unbearable where he entered the ED.   -- 2/12/2022 Venous doppler reported acute, non-occlusive deep venous thrombosis in the right common femoral vein at the saphenofemoral junction extending from the great saphenous vein. Acute, occlusive superficial venous thrombosis in the right great saphenous vein at the level of the saphenofemoral junction to the mid calf. -- The majority of his thrombus was in his GSV and remained superficial however while he was supine a portion of it extended past the saphenofemoral junction. It does retract with standing. But since it is close to the deep system he has been placed on Xarelto. The patient denied any traumatic event, injury, stretch injury, long trip and hormone therapy. He denies a family history of DVT and he denies a previous DVT.   -- 5/11/2022 Venous Doppler reported Chronic occlusive thrombus in the right great saphenous vein >5 cm from the deep system, the thrombus remains occlusive and extends to the below knee segment. There is an area of partial reconstitution at the distal thigh segment. -- The patient reported significant improvement in his right thigh and leg pain. He denies edema. He has been compliant with his anticoagulant Xarelto. Today patient denied fever, chills, night sweat, unintentional weight loss, skin lumps or bumps, acute bleeding or bruising issues. Denied headache, acute vision change, dizziness, chest pain, shortness of breath, palpitation, productive cough, nausea, vomiting, abdominal pain, altered bowel habits, dysuria, worsen bone pain or back pain, new focal numbness or weakness. Prior to Admission medications    Medication Sig Start Date End Date Taking? Authorizing Provider   sildenafil citrate (VIAGRA) 100 mg tablet TAKE 1 TABLET BY MOUTH 1 HOUR PRIOR TO INTERCOURSE 3/4/22  Yes Provider, Historical   docusate sodium (COLACE) 100 mg capsule Take 100 mg by mouth daily as needed. Yes Provider, Historical   amLODIPine (NORVASC) 5 mg tablet Take 5 mg by mouth daily. 3/4/22  Yes Provider, Historical   Xarelto 20 mg tab tablet Take 20 mg by mouth daily. 3/4/22  Yes Provider, Historical   Xarelto DVT-PE Treat 30d Start 15 mg (42)- 20 mg (9) DsPk TAKE BY MOUTH AS DIRECTED ON PACKAGE FOR 30 DAYS 2/13/22 9/12/22  Provider, Historical         Review of Systems   Constitutional: Negative. HENT: Negative. Eyes: Negative. Respiratory: Negative. Cardiovascular: Negative. Gastrointestinal: Negative. Genitourinary: Negative. Musculoskeletal: Negative. Skin: Negative. Neurological: Negative. Endo/Heme/Allergies: Negative. Psychiatric/Behavioral: Negative. Patient-Reported Weight: 136lb       Thierry Ceja was evaluated through a patient-initiated, synchronous (real-time) audio only encounter.  He (or guardian if applicable) is aware that it is a billable service, which includes applicable co-pays, with coverage as determined by his insurance carrier. This visit was conducted with the patient's (and/or Avni Allison guardian's) verbal consent. He has not had a related appointment within my department in the past 7 days or scheduled within the next 24 hours. The patient was located in a state where the provider was licensed to provide care.       Total Time: minutes: 11-20 minutes    Bj Walton DNP

## 2023-01-03 ENCOUNTER — APPOINTMENT (OUTPATIENT)
Dept: ONCOLOGY | Age: 53
End: 2023-01-03

## 2023-01-03 ENCOUNTER — HOSPITAL ENCOUNTER (OUTPATIENT)
Dept: LAB | Age: 53
Discharge: HOME | End: 2023-01-03
Payer: COMMERCIAL

## 2023-01-03 DIAGNOSIS — I82.401 ACUTE DEEP VEIN THROMBOSIS (DVT) OF RIGHT LOWER EXTREMITY, UNSPECIFIED VEIN (HCC): ICD-10-CM

## 2023-01-03 DIAGNOSIS — D75.838 THROMBOCYTOSIS AFTER SPLENECTOMY: ICD-10-CM

## 2023-01-03 DIAGNOSIS — Z90.81 THROMBOCYTOSIS AFTER SPLENECTOMY: ICD-10-CM

## 2023-01-03 LAB
ALBUMIN SERPL-MCNC: 4.2 G/DL (ref 3.4–5)
ALBUMIN/GLOB SERPL: 1.2 {RATIO} (ref 0.8–1.7)
ALP SERPL-CCNC: 84 U/L (ref 45–117)
ALT SERPL-CCNC: 41 U/L (ref 16–61)
ANION GAP SERPL CALC-SCNC: 9 MMOL/L (ref 3–18)
AST SERPL-CCNC: 73 U/L (ref 10–38)
BASOPHILS # BLD: 0 K/UL (ref 0–0.1)
BASOPHILS NFR BLD: 1 % (ref 0–2)
BILIRUB SERPL-MCNC: 0.7 MG/DL (ref 0.2–1)
BUN SERPL-MCNC: 11 MG/DL (ref 7–18)
BUN/CREAT SERPL: 15 (ref 12–20)
CALCIUM SERPL-MCNC: 9.5 MG/DL (ref 8.5–10.1)
CHLORIDE SERPL-SCNC: 104 MMOL/L (ref 100–111)
CO2 SERPL-SCNC: 27 MMOL/L (ref 21–32)
CREAT SERPL-MCNC: 0.75 MG/DL (ref 0.6–1.3)
DIFFERENTIAL METHOD BLD: ABNORMAL
EOSINOPHIL # BLD: 0 K/UL (ref 0–0.4)
EOSINOPHIL NFR BLD: 0 % (ref 0–5)
ERYTHROCYTE [DISTWIDTH] IN BLOOD BY AUTOMATED COUNT: 18.5 % (ref 11.6–14.5)
FERRITIN SERPL-MCNC: 1166 NG/ML (ref 8–388)
GLOBULIN SER CALC-MCNC: 3.5 G/DL (ref 2–4)
GLUCOSE SERPL-MCNC: 104 MG/DL (ref 74–99)
HCT VFR BLD AUTO: 34 % (ref 36–48)
HGB BLD-MCNC: 11.5 G/DL (ref 13–16)
IMM GRANULOCYTES # BLD AUTO: 0 K/UL (ref 0–0.04)
IMM GRANULOCYTES NFR BLD AUTO: 0 % (ref 0–0.5)
IRON SATN MFR SERPL: 99 % (ref 20–50)
IRON SERPL-MCNC: 252 UG/DL (ref 50–175)
LYMPHOCYTES # BLD: 1 K/UL (ref 0.9–3.6)
LYMPHOCYTES NFR BLD: 17 % (ref 21–52)
MCH RBC QN AUTO: 28.3 PG (ref 24–34)
MCHC RBC AUTO-ENTMCNC: 33.8 G/DL (ref 31–37)
MCV RBC AUTO: 83.7 FL (ref 78–100)
MONOCYTES # BLD: 0.8 K/UL (ref 0.05–1.2)
MONOCYTES NFR BLD: 13 % (ref 3–10)
NEUTS SEG # BLD: 4 K/UL (ref 1.8–8)
NEUTS SEG NFR BLD: 68 % (ref 40–73)
NRBC # BLD: 0.37 K/UL (ref 0–0.01)
NRBC BLD-RTO: 6.3 PER 100 WBC
PLATELET # BLD AUTO: 416 K/UL (ref 135–420)
PMV BLD AUTO: 10.1 FL (ref 9.2–11.8)
POTASSIUM SERPL-SCNC: 3.9 MMOL/L (ref 3.5–5.5)
PROT SERPL-MCNC: 7.7 G/DL (ref 6.4–8.2)
RBC # BLD AUTO: 4.06 M/UL (ref 4.35–5.65)
SODIUM SERPL-SCNC: 140 MMOL/L (ref 136–145)
TIBC SERPL-MCNC: 254 UG/DL (ref 250–450)
WBC # BLD AUTO: 5.9 K/UL (ref 4.6–13.2)

## 2023-01-03 PROCEDURE — 85025 COMPLETE CBC W/AUTO DIFF WBC: CPT

## 2023-01-03 PROCEDURE — 82728 ASSAY OF FERRITIN: CPT

## 2023-01-03 PROCEDURE — 83540 ASSAY OF IRON: CPT

## 2023-01-03 PROCEDURE — 36415 COLL VENOUS BLD VENIPUNCTURE: CPT

## 2023-01-03 PROCEDURE — 80053 COMPREHEN METABOLIC PANEL: CPT

## 2023-01-12 ENCOUNTER — OFFICE VISIT (OUTPATIENT)
Dept: ONCOLOGY | Age: 53
End: 2023-01-12
Payer: COMMERCIAL

## 2023-01-12 VITALS
OXYGEN SATURATION: 100 % | WEIGHT: 125 LBS | RESPIRATION RATE: 16 BRPM | DIASTOLIC BLOOD PRESSURE: 81 MMHG | SYSTOLIC BLOOD PRESSURE: 121 MMHG | HEART RATE: 90 BPM | HEIGHT: 71 IN | BODY MASS INDEX: 17.5 KG/M2

## 2023-01-12 DIAGNOSIS — Z90.81 THROMBOCYTOSIS AFTER SPLENECTOMY: ICD-10-CM

## 2023-01-12 DIAGNOSIS — D75.838 THROMBOCYTOSIS AFTER SPLENECTOMY: ICD-10-CM

## 2023-01-12 DIAGNOSIS — I82.401 ACUTE DEEP VEIN THROMBOSIS (DVT) OF RIGHT LOWER EXTREMITY, UNSPECIFIED VEIN (HCC): ICD-10-CM

## 2023-01-12 NOTE — PROGRESS NOTES
Hematology/Oncology  Progress Note    Name: Lawrence Snell  Date: 2023  : 1970    Emmett Schultz MD     Mr. Thalia Leary is a 47y.o. year old male who was seen for . Subjective:   History of Present Illness:   Mr. Thalia Leary is a most pleasant 47y.o. year old male who was seen for management of Right Lower extremity DVT. The patient has a past medical history significant of alcohol induced pancreatitis, pancreatomy and splenectomy in 2018. 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 The pain went on for several days until it became unbearable where he entered the ED.   -- 2022 Venous doppler reported acute, non-occlusive deep venous thrombosis in the right common femoral vein at the saphenofemoral junction extending from the great saphenous vein. Acute, occlusive superficial venous thrombosis in the right great saphenous vein at the level of the saphenofemoral junction to the mid calf. -- The majority of his thrombus was in his GSV and remained superficial however while he was supine a portion of it extended past the saphenofemoral junction. It does retract with standing. But since it is close to the deep system he has been placed on Xarelto. The patient denied any traumatic event, injury, stretch injury, long trip and hormone therapy. He denies a family history of DVT and he denies a previous DVT. -- 2022 Venous Doppler reported Chronic occlusive thrombus in the right great saphenous vein >5 cm from the deep system, the thrombus remains occlusive and extends to the below knee segment. There is an area of partial reconstitution at the distal thigh segment. -- The patient reported significant improvement in his right thigh and leg pain. He denies edema. He has been compliant with his anticoagulant Xarelto.         Today patient denied fever, chills, night sweat, unintentional weight loss, skin lumps or bumps, acute bleeding or bruising issues. Denied headache, acute vision change, dizziness, chest pain, worsening shortness of breath, palpitation, productive cough, nausea, vomiting, abdominal pain, altered bowel habits, dysuria, worsen bone pain or back pain, new focal numbness or weakness. Past medical history, family history, and social history: these were reviewed and remains unchanged. Past Medical History:   Diagnosis Date    Hypertension     Pancreatitis     Thromboembolus ([address]      Past Surgical History:   Procedure Laterality Date    HX APPENDECTOMY      HX GI       Social History     Socioeconomic History    Marital status:      Spouse name: Not on file    Number of children: Not on file    Years of education: Not on file    Highest education level: Not on file   Occupational History    Not on file   Tobacco Use    Smoking status: Every Day     Years: 6.00     Types: Cigarettes    Smokeless tobacco: Never   Vaping Use    Vaping Use: Former   Substance and Sexual Activity    Alcohol use: Yes     Alcohol/week: 14.0 standard drinks     Types: 14 Shots of liquor per week    Drug use: Never    Sexual activity: Not on file   Other Topics Concern    Not on file   Social History Narrative    Not on file     Social Determinants of Health     Financial Resource Strain: Not on file   Food Insecurity: Not on file   Transportation Needs: Not on file   Physical Activity: Not on file   Stress: Not on file   Social Connections: Not on file   Intimate Partner Violence: Not on file   Housing Stability: Not on file     Family History   Problem Relation Age of Onset    Cancer Father     Diabetes Maternal Uncle      Current Outpatient Medications   Medication Sig Dispense Refill    rivaroxaban (Xarelto) 10 mg tablet Take 1 Tablet by mouth daily.  90 Tablet 2    sildenafil citrate (VIAGRA) 100 mg tablet TAKE 1 TABLET BY MOUTH 1 HOUR PRIOR TO INTERCOURSE      docusate sodium (COLACE) 100 mg capsule Take 100 mg by mouth daily as needed. amLODIPine (NORVASC) 5 mg tablet Take 5 mg by mouth daily. Review of Systems  Constitutional: The patient has no acute distress or discomfort. HEENT: The patient denies recent head trauma, eye pain, blurred vision,  hearing deficit, oropharyngeal mucosal pain or lesions, and the patient denies throat pain or discomfort. Lymphatics: The patient denies palpable peripheral lymphadenopathy. Hematologic: The patient denies having bruising, bleeding, or progressive fatigue. Respiratory: Patient denies having shortness of breath, cough, sputum production, fever, or dyspnea on exertion. Cardiovascular: The patient denies having leg pain, leg swelling, heart palpitations, chest permit, chest pain, or lightheadedness. The patient denies having dyspnea on exertion. Gastrointestinal: The patient denies having nausea, emesis, or diarrhea. The patient denies having any hematemesis or blood in the stool. Genitourinary: Patient denies having urinary urgency, frequency, or dysuria. The patient denies having blood in the urine. Psychological: The patient denies having symptoms of nervousness, anxiety, depression, or thoughts of harming himself some of this. Skin: Patient denies having skin rashes, skin, ulcerations, or unexplained itching or pruritus. Musculoskeletal: The patient denies having pain in the joints or bones. Objective:   Visit Vitals  /81   Pulse 90   Resp 16   Ht 5' 11\" (1.803 m)   Wt 56.7 kg (125 lb)   SpO2 100%   BMI 17.43 kg/m²     ECOG PS 0  Physical Exam:   Gen. Appearance: The patient is in no acute distress. Skin: There is no bruise or rash. HEENT: The exam is unremarkable. Neck: Supple without lymphadenopathy or thyromegaly. Lungs: Clear to auscultation and percussion; there are no wheezes or rhonchi. Heart: Regular rate and rhythm; there are no murmurs, gallops, or rubs. Abdomen:  Bowel sounds are present and normal.  There is no guarding, tenderness, or hepatosplenomegaly. Extremities: There is no clubbing, cyanosis, or edema. Neurologic: There are no focal neurologic deficits. Lymphatics: There is no palpable peripheral lymphadenopathy. Musculoskeletal: The patient has full range of motion at all joints. There is no evidence of joint deformity or effusions. There is no focal joint tenderness. Psychological/psychiatric: There is no clinical evidence of anxiety, depression, or melancholy. Lab data:      No results found for this or any previous visit. Assessment:     1. Thrombocytosis after splenectomy    2. Acute deep vein thrombosis (DVT) of right lower extremity, unspecified vein (HCC)          Plan:   # Right Lower extremity DVT. # S.p Splenectomy  -- Past medical history significant of alcohol induced pancreatitis, pancreatomy and splenectomy in 2018. 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 The pain went on for several days until it became unbearable where he entered the ED.   -- 2/12/2022 Venous doppler reported acute, non-occlusive deep venous thrombosis in the right common femoral vein at the saphenofemoral junction extending from the great saphenous vein. Acute, occlusive superficial venous thrombosis in the right great saphenous vein at the level of the saphenofemoral junction to the mid calf. -- The majority of his thrombus was in his GSV and remained superficial however while he was supine a portion of it extended past the saphenofemoral junction. It does retract with standing. But since it is close to the deep system he has been placed on Xarelto. The patient denied any traumatic event, injury, stretch injury, long trip and hormone therapy. He denies a family history of DVT and he denies a previous DVT.   -- 5/11/2022 Venous Doppler reported Chronic occlusive thrombus in the right great saphenous vein >5 cm from the deep system, the thrombus remains occlusive and extends to the below knee segment. There is an area of partial reconstitution at the distal thigh segment. -- The patient reported significant improvement in his right thigh and leg pain. He denies edema. He has been compliant with his anticoagulant Xarelto. Plan:  -- Given hx splenectomy with VTE, the patient will likely benefit from long-term anticoagulation to prevent further VTEs. He is presently taking Xarelto. Tolerating medication . -- Recent repeat D-dimer  was normal at  0.39   --- Venous study on 9/8/2022   Known (5/12/2022) chronic superficial thrombophlebitis noted within the right great saphenous vein >5 cm from the deep system. No evidence of deep vein thrombosis within the right lower extremity. -- He will continue taking Xarelto 10 mg daily   --- He will f/u PCP for life style modifications, smoking cessation. -- We will see the patient back in about 4 months. Always sooner if required. # History of Iron deficiency  # Thrombocytosis s.p Splenectomy  -- 5/7/2022  Negative JAK2 V617F mutation. -- 01/03/2022  PLTs 416K.  -- He has been advise hold the oral iron supplement due to the elevated Ferritin and iron. -- Today I have reviewed with the patient about recent lab reports. Orders Placed This Encounter    rivaroxaban (Xarelto) 10 mg tablet     Sig: Take 1 Tablet by mouth daily. Dispense:  90 Tablet     Refill:  2       Kentrell Lundbreg DNP  1/12/2023      Please note: This document has been produced using voice recognition software. Unrecognized errors in transcription may be present.

## 2023-05-05 ENCOUNTER — HOSPITAL ENCOUNTER (OUTPATIENT)
Facility: HOSPITAL | Age: 53
Setting detail: SPECIMEN
End: 2023-05-05
Payer: COMMERCIAL

## 2023-05-05 DIAGNOSIS — D75.838 OTHER THROMBOCYTOSIS: ICD-10-CM

## 2023-05-05 DIAGNOSIS — I82.401 ACUTE EMBOLISM AND THROMBOSIS OF UNSPECIFIED DEEP VEINS OF RIGHT LOWER EXTREMITY (HCC): ICD-10-CM

## 2023-05-05 LAB
ALBUMIN SERPL-MCNC: 4.2 G/DL (ref 3.4–5)
ALBUMIN/GLOB SERPL: 1.4 (ref 0.8–1.7)
ALP SERPL-CCNC: 73 U/L (ref 45–117)
ALT SERPL-CCNC: 25 U/L (ref 16–61)
ANION GAP SERPL CALC-SCNC: 6 MMOL/L (ref 3–18)
AST SERPL-CCNC: 18 U/L (ref 10–38)
BASOPHILS # BLD: 0.1 K/UL (ref 0–0.1)
BASOPHILS NFR BLD: 1 % (ref 0–2)
BILIRUB SERPL-MCNC: 0.3 MG/DL (ref 0.2–1)
BUN SERPL-MCNC: 10 MG/DL (ref 7–18)
BUN/CREAT SERPL: 12 (ref 12–20)
CALCIUM SERPL-MCNC: 9.9 MG/DL (ref 8.5–10.1)
CHLORIDE SERPL-SCNC: 103 MMOL/L (ref 100–111)
CO2 SERPL-SCNC: 27 MMOL/L (ref 21–32)
CREAT SERPL-MCNC: 0.83 MG/DL (ref 0.6–1.3)
DIFFERENTIAL METHOD BLD: ABNORMAL
EOSINOPHIL # BLD: 0.1 K/UL (ref 0–0.4)
EOSINOPHIL NFR BLD: 1 % (ref 0–5)
ERYTHROCYTE [DISTWIDTH] IN BLOOD BY AUTOMATED COUNT: 16.1 % (ref 11.6–14.5)
FERRITIN SERPL-MCNC: 146 NG/ML (ref 8–388)
GLOBULIN SER CALC-MCNC: 3.1 G/DL (ref 2–4)
GLUCOSE SERPL-MCNC: 116 MG/DL (ref 74–99)
HCT VFR BLD AUTO: 39.3 % (ref 36–48)
HGB BLD-MCNC: 12.9 G/DL (ref 13–16)
IMM GRANULOCYTES # BLD AUTO: 0 K/UL (ref 0–0.04)
IMM GRANULOCYTES NFR BLD AUTO: 0 % (ref 0–0.5)
IRON SATN MFR SERPL: 26 % (ref 20–50)
IRON SERPL-MCNC: 81 UG/DL (ref 50–175)
LYMPHOCYTES # BLD: 2.4 K/UL (ref 0.9–3.6)
LYMPHOCYTES NFR BLD: 30 % (ref 21–52)
MCH RBC QN AUTO: 23.7 PG (ref 24–34)
MCHC RBC AUTO-ENTMCNC: 32.8 G/DL (ref 31–37)
MCV RBC AUTO: 72.1 FL (ref 78–100)
MONOCYTES # BLD: 0.8 K/UL (ref 0.05–1.2)
MONOCYTES NFR BLD: 11 % (ref 3–10)
NEUTS SEG # BLD: 4.5 K/UL (ref 1.8–8)
NEUTS SEG NFR BLD: 57 % (ref 40–73)
NRBC # BLD: 0.05 K/UL (ref 0–0.01)
NRBC BLD-RTO: 0.6 PER 100 WBC
PLATELET # BLD AUTO: 457 K/UL (ref 135–420)
PMV BLD AUTO: 9.2 FL (ref 9.2–11.8)
POTASSIUM SERPL-SCNC: 4.9 MMOL/L (ref 3.5–5.5)
PROT SERPL-MCNC: 7.3 G/DL (ref 6.4–8.2)
RBC # BLD AUTO: 5.45 M/UL (ref 4.35–5.65)
SODIUM SERPL-SCNC: 136 MMOL/L (ref 136–145)
TIBC SERPL-MCNC: 316 UG/DL (ref 250–450)
WBC # BLD AUTO: 7.9 K/UL (ref 4.6–13.2)

## 2023-05-05 PROCEDURE — 82728 ASSAY OF FERRITIN: CPT

## 2023-05-05 PROCEDURE — 83540 ASSAY OF IRON: CPT

## 2023-05-05 PROCEDURE — 36415 COLL VENOUS BLD VENIPUNCTURE: CPT

## 2023-05-05 PROCEDURE — 85025 COMPLETE CBC W/AUTO DIFF WBC: CPT

## 2023-05-05 PROCEDURE — 80053 COMPREHEN METABOLIC PANEL: CPT

## 2023-05-05 PROCEDURE — 83550 IRON BINDING TEST: CPT

## 2023-05-12 ENCOUNTER — OFFICE VISIT (OUTPATIENT)
Age: 53
End: 2023-05-12
Payer: COMMERCIAL

## 2023-05-12 VITALS
BODY MASS INDEX: 20.72 KG/M2 | HEIGHT: 71 IN | HEART RATE: 80 BPM | OXYGEN SATURATION: 100 % | WEIGHT: 148 LBS | DIASTOLIC BLOOD PRESSURE: 99 MMHG | RESPIRATION RATE: 16 BRPM | SYSTOLIC BLOOD PRESSURE: 131 MMHG

## 2023-05-12 DIAGNOSIS — D50.8 IRON DEFICIENCY ANEMIA SECONDARY TO INADEQUATE DIETARY IRON INTAKE: ICD-10-CM

## 2023-05-12 DIAGNOSIS — D75.838 OTHER THROMBOCYTOSIS: Primary | ICD-10-CM

## 2023-05-12 DIAGNOSIS — D75.839 THROMBOCYTOSIS: ICD-10-CM

## 2023-05-12 DIAGNOSIS — I82.401 ACUTE EMBOLISM AND THROMBOSIS OF UNSPECIFIED DEEP VEINS OF RIGHT LOWER EXTREMITY (HCC): ICD-10-CM

## 2023-05-12 DIAGNOSIS — Z90.81 H/O SPLENECTOMY: ICD-10-CM

## 2023-05-12 PROCEDURE — 99213 OFFICE O/P EST LOW 20 MIN: CPT | Performed by: INTERNAL MEDICINE

## 2023-05-12 ASSESSMENT — ENCOUNTER SYMPTOMS
NAUSEA: 0
COLOR CHANGE: 0
CHEST TIGHTNESS: 0
BLOOD IN STOOL: 0
DIARRHEA: 0
ABDOMINAL PAIN: 0
SHORTNESS OF BREATH: 0
VOMITING: 0
CONSTIPATION: 0

## 2024-10-15 ENCOUNTER — APPOINTMENT (OUTPATIENT)
Facility: HOSPITAL | Age: 54
DRG: 439 | End: 2024-10-15

## 2024-10-15 ENCOUNTER — HOSPITAL ENCOUNTER (INPATIENT)
Facility: HOSPITAL | Age: 54
LOS: 3 days | Discharge: HOME | DRG: 439 | End: 2024-10-18
Attending: EMERGENCY MEDICINE | Admitting: STUDENT IN AN ORGANIZED HEALTH CARE EDUCATION/TRAINING PROGRAM

## 2024-10-15 DIAGNOSIS — K85.90 ACUTE PANCREATITIS, UNSPECIFIED COMPLICATION STATUS, UNSPECIFIED PANCREATITIS TYPE: Primary | ICD-10-CM

## 2024-10-15 DIAGNOSIS — R10.84 GENERALIZED ABDOMINAL PAIN: ICD-10-CM

## 2024-10-15 DIAGNOSIS — R07.9 CHEST PAIN, UNSPECIFIED TYPE: ICD-10-CM

## 2024-10-15 LAB
ALBUMIN SERPL-MCNC: 4.4 G/DL (ref 3.4–5)
ALBUMIN/GLOB SERPL: 1.3 (ref 0.8–1.7)
ALP SERPL-CCNC: 67 U/L (ref 45–117)
ALT SERPL-CCNC: 23 U/L (ref 16–61)
ANION GAP SERPL CALC-SCNC: 5 MMOL/L (ref 3–18)
APPEARANCE UR: CLEAR
AST SERPL-CCNC: 19 U/L (ref 10–38)
BASOPHILS # BLD: 0 K/UL (ref 0–0.1)
BASOPHILS NFR BLD: 0 % (ref 0–2)
BILIRUB SERPL-MCNC: 0.6 MG/DL (ref 0.2–1)
BILIRUB UR QL: NEGATIVE
BUN SERPL-MCNC: 6 MG/DL (ref 7–18)
BUN/CREAT SERPL: 8 (ref 12–20)
CALCIUM SERPL-MCNC: 9.9 MG/DL (ref 8.5–10.1)
CHLORIDE SERPL-SCNC: 101 MMOL/L (ref 100–111)
CO2 SERPL-SCNC: 26 MMOL/L (ref 21–32)
COLOR UR: YELLOW
CREAT SERPL-MCNC: 0.74 MG/DL (ref 0.6–1.3)
D DIMER PPP FEU-MCNC: 0.47 UG/ML(FEU)
DIFFERENTIAL METHOD BLD: ABNORMAL
EOSINOPHIL # BLD: 0.1 K/UL (ref 0–0.4)
EOSINOPHIL NFR BLD: 0 % (ref 0–5)
ERYTHROCYTE [DISTWIDTH] IN BLOOD BY AUTOMATED COUNT: 15.9 % (ref 11.6–14.5)
GLOBULIN SER CALC-MCNC: 3.3 G/DL (ref 2–4)
GLUCOSE SERPL-MCNC: 114 MG/DL (ref 74–99)
GLUCOSE UR STRIP.AUTO-MCNC: NEGATIVE MG/DL
HCT VFR BLD AUTO: 35.4 % (ref 36–48)
HGB BLD-MCNC: 11.9 G/DL (ref 13–16)
HGB UR QL STRIP: NEGATIVE
IMM GRANULOCYTES # BLD AUTO: 0.1 K/UL (ref 0–0.04)
IMM GRANULOCYTES NFR BLD AUTO: 1 % (ref 0–0.5)
KETONES UR QL STRIP.AUTO: NEGATIVE MG/DL
LACTATE BLD-SCNC: 2.39 MMOL/L (ref 0.4–2)
LACTATE SERPL-SCNC: 1.5 MMOL/L (ref 0.4–2)
LEUKOCYTE ESTERASE UR QL STRIP.AUTO: NEGATIVE
LIPASE SERPL-CCNC: 299 U/L (ref 13–75)
LYMPHOCYTES # BLD: 2 K/UL (ref 0.9–3.6)
LYMPHOCYTES NFR BLD: 15 % (ref 21–52)
MAGNESIUM SERPL-MCNC: 1.9 MG/DL (ref 1.6–2.6)
MCH RBC QN AUTO: 25.4 PG (ref 24–34)
MCHC RBC AUTO-ENTMCNC: 33.6 G/DL (ref 31–37)
MCV RBC AUTO: 75.5 FL (ref 78–100)
MONOCYTES # BLD: 1 K/UL (ref 0.05–1.2)
MONOCYTES NFR BLD: 8 % (ref 3–10)
NEUTS SEG # BLD: 10.6 K/UL (ref 1.8–8)
NEUTS SEG NFR BLD: 77 % (ref 40–73)
NITRITE UR QL STRIP.AUTO: NEGATIVE
NRBC # BLD: 0.03 K/UL (ref 0–0.01)
NRBC BLD-RTO: 0.2 PER 100 WBC
NT PRO BNP: 69 PG/ML (ref 0–900)
PH UR STRIP: 8 (ref 5–8)
PLATELET # BLD AUTO: 413 K/UL (ref 135–420)
PMV BLD AUTO: 9 FL (ref 9.2–11.8)
POTASSIUM SERPL-SCNC: 4.2 MMOL/L (ref 3.5–5.5)
PROT SERPL-MCNC: 7.7 G/DL (ref 6.4–8.2)
PROT UR STRIP-MCNC: NEGATIVE MG/DL
RBC # BLD AUTO: 4.69 M/UL (ref 4.35–5.65)
SODIUM SERPL-SCNC: 132 MMOL/L (ref 136–145)
SP GR UR REFRACTOMETRY: 1.01 (ref 1–1.03)
TROPONIN I SERPL HS-MCNC: 5 NG/L (ref 0–78)
TROPONIN I SERPL HS-MCNC: 6 NG/L (ref 0–78)
UROBILINOGEN UR QL STRIP.AUTO: 1 EU/DL (ref 0.2–1)
WBC # BLD AUTO: 13.8 K/UL (ref 4.6–13.2)

## 2024-10-15 PROCEDURE — 99285 EMERGENCY DEPT VISIT HI MDM: CPT

## 2024-10-15 PROCEDURE — 99223 1ST HOSP IP/OBS HIGH 75: CPT | Performed by: STUDENT IN AN ORGANIZED HEALTH CARE EDUCATION/TRAINING PROGRAM

## 2024-10-15 PROCEDURE — 71045 X-RAY EXAM CHEST 1 VIEW: CPT

## 2024-10-15 PROCEDURE — 83690 ASSAY OF LIPASE: CPT

## 2024-10-15 PROCEDURE — 96365 THER/PROPH/DIAG IV INF INIT: CPT

## 2024-10-15 PROCEDURE — 71250 CT THORAX DX C-: CPT

## 2024-10-15 PROCEDURE — 87154 CUL TYP ID BLD PTHGN 6+ TRGT: CPT

## 2024-10-15 PROCEDURE — 87086 URINE CULTURE/COLONY COUNT: CPT

## 2024-10-15 PROCEDURE — 6360000002 HC RX W HCPCS: Performed by: EMERGENCY MEDICINE

## 2024-10-15 PROCEDURE — 85379 FIBRIN DEGRADATION QUANT: CPT

## 2024-10-15 PROCEDURE — 96374 THER/PROPH/DIAG INJ IV PUSH: CPT

## 2024-10-15 PROCEDURE — 1100000000 HC RM PRIVATE

## 2024-10-15 PROCEDURE — 80053 COMPREHEN METABOLIC PANEL: CPT

## 2024-10-15 PROCEDURE — 87040 BLOOD CULTURE FOR BACTERIA: CPT

## 2024-10-15 PROCEDURE — 83605 ASSAY OF LACTIC ACID: CPT

## 2024-10-15 PROCEDURE — 2580000003 HC RX 258: Performed by: EMERGENCY MEDICINE

## 2024-10-15 PROCEDURE — 83880 ASSAY OF NATRIURETIC PEPTIDE: CPT

## 2024-10-15 PROCEDURE — 94761 N-INVAS EAR/PLS OXIMETRY MLT: CPT

## 2024-10-15 PROCEDURE — 81003 URINALYSIS AUTO W/O SCOPE: CPT

## 2024-10-15 PROCEDURE — 83735 ASSAY OF MAGNESIUM: CPT

## 2024-10-15 PROCEDURE — 2580000003 HC RX 258: Performed by: STUDENT IN AN ORGANIZED HEALTH CARE EDUCATION/TRAINING PROGRAM

## 2024-10-15 PROCEDURE — 84484 ASSAY OF TROPONIN QUANT: CPT

## 2024-10-15 PROCEDURE — 85025 COMPLETE CBC W/AUTO DIFF WBC: CPT

## 2024-10-15 RX ORDER — SODIUM CHLORIDE, SODIUM LACTATE, POTASSIUM CHLORIDE, CALCIUM CHLORIDE 600; 310; 30; 20 MG/100ML; MG/100ML; MG/100ML; MG/100ML
INJECTION, SOLUTION INTRAVENOUS CONTINUOUS
Status: DISCONTINUED | OUTPATIENT
Start: 2024-10-16 | End: 2024-10-18 | Stop reason: HOSPADM

## 2024-10-15 RX ORDER — SODIUM CHLORIDE, SODIUM LACTATE, POTASSIUM CHLORIDE, AND CALCIUM CHLORIDE .6; .31; .03; .02 G/100ML; G/100ML; G/100ML; G/100ML
1000 INJECTION, SOLUTION INTRAVENOUS ONCE
Status: COMPLETED | OUTPATIENT
Start: 2024-10-15 | End: 2024-10-15

## 2024-10-15 RX ORDER — ONDANSETRON 4 MG/1
4 TABLET, ORALLY DISINTEGRATING ORAL EVERY 8 HOURS PRN
Status: DISCONTINUED | OUTPATIENT
Start: 2024-10-15 | End: 2024-10-18 | Stop reason: HOSPADM

## 2024-10-15 RX ORDER — POTASSIUM CHLORIDE 29.8 MG/ML
20 INJECTION INTRAVENOUS PRN
Status: DISCONTINUED | OUTPATIENT
Start: 2024-10-15 | End: 2024-10-18 | Stop reason: HOSPADM

## 2024-10-15 RX ORDER — SODIUM CHLORIDE 9 MG/ML
INJECTION, SOLUTION INTRAVENOUS PRN
Status: DISCONTINUED | OUTPATIENT
Start: 2024-10-15 | End: 2024-10-18 | Stop reason: HOSPADM

## 2024-10-15 RX ORDER — LABETALOL HYDROCHLORIDE 5 MG/ML
10 INJECTION, SOLUTION INTRAVENOUS EVERY 6 HOURS PRN
Status: DISCONTINUED | OUTPATIENT
Start: 2024-10-15 | End: 2024-10-18 | Stop reason: HOSPADM

## 2024-10-15 RX ORDER — ONDANSETRON 2 MG/ML
4 INJECTION INTRAMUSCULAR; INTRAVENOUS EVERY 6 HOURS PRN
Status: DISCONTINUED | OUTPATIENT
Start: 2024-10-15 | End: 2024-10-18 | Stop reason: HOSPADM

## 2024-10-15 RX ORDER — SODIUM CHLORIDE 0.9 % (FLUSH) 0.9 %
5-40 SYRINGE (ML) INJECTION EVERY 12 HOURS SCHEDULED
Status: DISCONTINUED | OUTPATIENT
Start: 2024-10-15 | End: 2024-10-18 | Stop reason: HOSPADM

## 2024-10-15 RX ORDER — HEPARIN SODIUM 10000 [USP'U]/100ML
5-30 INJECTION, SOLUTION INTRAVENOUS CONTINUOUS
Status: DISCONTINUED | OUTPATIENT
Start: 2024-10-15 | End: 2024-10-16

## 2024-10-15 RX ORDER — MORPHINE SULFATE 4 MG/ML
4 INJECTION, SOLUTION INTRAMUSCULAR; INTRAVENOUS
Status: COMPLETED | OUTPATIENT
Start: 2024-10-15 | End: 2024-10-15

## 2024-10-15 RX ORDER — ENOXAPARIN SODIUM 100 MG/ML
40 INJECTION SUBCUTANEOUS DAILY
Status: DISCONTINUED | OUTPATIENT
Start: 2024-10-15 | End: 2024-10-15

## 2024-10-15 RX ORDER — HEPARIN SODIUM 1000 [USP'U]/ML
40 INJECTION, SOLUTION INTRAVENOUS; SUBCUTANEOUS PRN
Status: DISCONTINUED | OUTPATIENT
Start: 2024-10-15 | End: 2024-10-16

## 2024-10-15 RX ORDER — MORPHINE SULFATE 2 MG/ML
2 INJECTION, SOLUTION INTRAMUSCULAR; INTRAVENOUS
Status: DISCONTINUED | OUTPATIENT
Start: 2024-10-15 | End: 2024-10-18 | Stop reason: HOSPADM

## 2024-10-15 RX ORDER — POTASSIUM CHLORIDE 7.45 MG/ML
10 INJECTION INTRAVENOUS PRN
Status: DISCONTINUED | OUTPATIENT
Start: 2024-10-15 | End: 2024-10-18 | Stop reason: HOSPADM

## 2024-10-15 RX ORDER — MORPHINE SULFATE 4 MG/ML
4 INJECTION, SOLUTION INTRAMUSCULAR; INTRAVENOUS
Status: DISCONTINUED | OUTPATIENT
Start: 2024-10-15 | End: 2024-10-18 | Stop reason: HOSPADM

## 2024-10-15 RX ORDER — SODIUM CHLORIDE 0.9 % (FLUSH) 0.9 %
5-40 SYRINGE (ML) INJECTION PRN
Status: DISCONTINUED | OUTPATIENT
Start: 2024-10-15 | End: 2024-10-18 | Stop reason: HOSPADM

## 2024-10-15 RX ORDER — SODIUM CHLORIDE, SODIUM LACTATE, POTASSIUM CHLORIDE, CALCIUM CHLORIDE 600; 310; 30; 20 MG/100ML; MG/100ML; MG/100ML; MG/100ML
INJECTION, SOLUTION INTRAVENOUS CONTINUOUS
Status: DISPENSED | OUTPATIENT
Start: 2024-10-15 | End: 2024-10-16

## 2024-10-15 RX ORDER — HEPARIN SODIUM 1000 [USP'U]/ML
80 INJECTION, SOLUTION INTRAVENOUS; SUBCUTANEOUS PRN
Status: DISCONTINUED | OUTPATIENT
Start: 2024-10-15 | End: 2024-10-16

## 2024-10-15 RX ADMIN — PIPERACILLIN AND TAZOBACTAM 4500 MG: 4; .5 INJECTION, POWDER, FOR SOLUTION INTRAVENOUS at 19:43

## 2024-10-15 RX ADMIN — SODIUM CHLORIDE, POTASSIUM CHLORIDE, SODIUM LACTATE AND CALCIUM CHLORIDE 1000 ML: 600; 310; 30; 20 INJECTION, SOLUTION INTRAVENOUS at 19:40

## 2024-10-15 RX ADMIN — SODIUM CHLORIDE, POTASSIUM CHLORIDE, SODIUM LACTATE AND CALCIUM CHLORIDE 1000 ML: 600; 310; 30; 20 INJECTION, SOLUTION INTRAVENOUS at 15:21

## 2024-10-15 RX ADMIN — MORPHINE SULFATE 4 MG: 4 INJECTION, SOLUTION INTRAMUSCULAR; INTRAVENOUS at 16:00

## 2024-10-15 ASSESSMENT — PAIN SCALES - GENERAL
PAINLEVEL_OUTOF10: 7
PAINLEVEL_OUTOF10: 6

## 2024-10-15 ASSESSMENT — PAIN DESCRIPTION - DESCRIPTORS
DESCRIPTORS: SHARP;PRESSURE
DESCRIPTORS: SHARP;PRESSURE

## 2024-10-15 ASSESSMENT — PAIN DESCRIPTION - LOCATION
LOCATION: CHEST
LOCATION: CHEST

## 2024-10-15 ASSESSMENT — PAIN DESCRIPTION - ORIENTATION
ORIENTATION: RIGHT;LEFT
ORIENTATION: RIGHT;LEFT

## 2024-10-15 NOTE — ED PROVIDER NOTES
EMERGENCY DEPARTMENT HISTORY AND PHYSICAL EXAM    3:03 PM      Date: 10/15/2024  Patient Name: Misael Plascencia    History of Presenting Illness     Chief Complaint   Patient presents with    Chest Pain       History From: Patient and EMS  HPI  Misael Plascencia is a 54 y.o. who male  has a past medical history of Hypertension, Pancreatitis, and Thromboembolus (HCC).. Misael Plascencia is a 54 y.o. male presenting with chest pain.  Patient states her last several days he has had low back pain/mid back pain with radiation to his abdomen and now up to his chest.  He also Dors is a history of DVT for which she was taking Xarelto approximately 1 month ago until he states he could no longer afford it.  States he is taking a baby aspirin daily.  States he has this pain intermittently and has not improved with heating pads and rest.  He has noted some shortness of breath.  States that he did lower himself/fall to the ground at home secondary to pain.  He denies loss of consciousness or head injury.  Last dose of Xarelto was approximately 1 month ago.  He denies any fevers or chills.  Denies any nausea or vomiting.  States his last bowel movement was earlier today denies melena or hematochezia.  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      Nursing Notes were all reviewed and agreed with or any disagreements were addressed in the HPI.    PCP: Connor Fonseca MD    Current Facility-Administered Medications   Medication Dose Route Frequency Provider Last Rate Last Admin    lactated ringers bolus 1,000 mL  1,000 mL IntraVENous Once Jose Knott DO         Current Outpatient Medications   Medication Sig Dispense Refill    amLODIPine (NORVASC) 5 MG tablet Take 5 mg by mouth daily      docusate (COLACE, DULCOLAX) 100 MG CAPS Take 100 mg by mouth daily as needed      rivaroxaban (XARELTO) 10

## 2024-10-15 NOTE — ED NOTES
Confirmed plan with Dr. Moyer and LUIS CARLOS Gonzalez of withholding antibiotics and fluids per sepsis protocol d/t concerns elevated WBC and lactic acid were d/t etiology other than infection.     Now, the plan is to administer antibiotics and fluids per MAR.

## 2024-10-15 NOTE — ED NOTES
Report given to Stepan RN    Pt awake and alert lying in stretcher. Attached to cardiac monitor.     Nad att.

## 2024-10-16 ENCOUNTER — APPOINTMENT (OUTPATIENT)
Facility: HOSPITAL | Age: 54
DRG: 439 | End: 2024-10-16

## 2024-10-16 PROBLEM — D50.9 MICROCYTIC ANEMIA: Status: ACTIVE | Noted: 2024-10-16

## 2024-10-16 PROBLEM — I82.401 ACUTE DEEP VEIN THROMBOSIS (DVT) OF RIGHT LOWER EXTREMITY (HCC): Status: ACTIVE | Noted: 2024-10-16

## 2024-10-16 PROBLEM — F17.200 TOBACCO DEPENDENCE: Status: ACTIVE | Noted: 2024-10-16

## 2024-10-16 LAB
ALBUMIN SERPL-MCNC: 3.3 G/DL (ref 3.4–5)
ALBUMIN/GLOB SERPL: 1.2 (ref 0.8–1.7)
ALP SERPL-CCNC: 53 U/L (ref 45–117)
ALT SERPL-CCNC: 18 U/L (ref 16–61)
ANION GAP SERPL CALC-SCNC: 4 MMOL/L (ref 3–18)
APTT PPP: 31.9 SEC (ref 23–36.4)
APTT PPP: 53.4 SEC (ref 23–36.4)
AST SERPL-CCNC: 15 U/L (ref 10–38)
BACTERIA SPEC CULT: NORMAL
BASOPHILS # BLD: 0 K/UL (ref 0–0.1)
BASOPHILS NFR BLD: 0 % (ref 0–2)
BILIRUB SERPL-MCNC: 0.8 MG/DL (ref 0.2–1)
BUN SERPL-MCNC: 7 MG/DL (ref 7–18)
BUN/CREAT SERPL: 10 (ref 12–20)
CALCIUM SERPL-MCNC: 8.5 MG/DL (ref 8.5–10.1)
CHLORIDE SERPL-SCNC: 107 MMOL/L (ref 100–111)
CO2 SERPL-SCNC: 25 MMOL/L (ref 21–32)
CREAT SERPL-MCNC: 0.68 MG/DL (ref 0.6–1.3)
DIFFERENTIAL METHOD BLD: ABNORMAL
EKG ATRIAL RATE: 67 BPM
EKG DIAGNOSIS: NORMAL
EKG P AXIS: 62 DEGREES
EKG P-R INTERVAL: 196 MS
EKG Q-T INTERVAL: 402 MS
EKG QRS DURATION: 94 MS
EKG QTC CALCULATION (BAZETT): 424 MS
EKG R AXIS: 28 DEGREES
EKG T AXIS: 46 DEGREES
EKG VENTRICULAR RATE: 67 BPM
EOSINOPHIL # BLD: 0.1 K/UL (ref 0–0.4)
EOSINOPHIL NFR BLD: 1 % (ref 0–5)
ERYTHROCYTE [DISTWIDTH] IN BLOOD BY AUTOMATED COUNT: 15.4 % (ref 11.6–14.5)
FERRITIN SERPL-MCNC: 262 NG/ML (ref 8–388)
GLOBULIN SER CALC-MCNC: 2.8 G/DL (ref 2–4)
GLUCOSE SERPL-MCNC: 93 MG/DL (ref 74–99)
HCT VFR BLD AUTO: 30.5 % (ref 36–48)
HGB BLD-MCNC: 10.1 G/DL (ref 13–16)
IMM GRANULOCYTES # BLD AUTO: 0 K/UL (ref 0–0.04)
IMM GRANULOCYTES NFR BLD AUTO: 0 % (ref 0–0.5)
IRON SATN MFR SERPL: 28 % (ref 20–50)
IRON SERPL-MCNC: 61 UG/DL (ref 50–175)
LACTATE SERPL-SCNC: 1.1 MMOL/L (ref 0.4–2)
LYMPHOCYTES # BLD: 2.4 K/UL (ref 0.9–3.6)
LYMPHOCYTES NFR BLD: 26 % (ref 21–52)
MCH RBC QN AUTO: 24.9 PG (ref 24–34)
MCHC RBC AUTO-ENTMCNC: 33.1 G/DL (ref 31–37)
MCV RBC AUTO: 75.3 FL (ref 78–100)
MONOCYTES # BLD: 1 K/UL (ref 0.05–1.2)
MONOCYTES NFR BLD: 10 % (ref 3–10)
NEUTS SEG # BLD: 5.9 K/UL (ref 1.8–8)
NEUTS SEG NFR BLD: 62 % (ref 40–73)
NRBC # BLD: 0.03 K/UL (ref 0–0.01)
NRBC BLD-RTO: 0.3 PER 100 WBC
PLATELET # BLD AUTO: 357 K/UL (ref 135–420)
PMV BLD AUTO: 9.3 FL (ref 9.2–11.8)
POTASSIUM SERPL-SCNC: 4 MMOL/L (ref 3.5–5.5)
PROCALCITONIN SERPL-MCNC: <0.05 NG/ML
PROT SERPL-MCNC: 6.1 G/DL (ref 6.4–8.2)
RBC # BLD AUTO: 4.05 M/UL (ref 4.35–5.65)
SERVICE CMNT-IMP: NORMAL
SODIUM SERPL-SCNC: 136 MMOL/L (ref 136–145)
TIBC SERPL-MCNC: 217 UG/DL (ref 250–450)
TRIGL SERPL-MCNC: 94 MG/DL
WBC # BLD AUTO: 9.5 K/UL (ref 4.6–13.2)

## 2024-10-16 PROCEDURE — 6360000002 HC RX W HCPCS: Performed by: STUDENT IN AN ORGANIZED HEALTH CARE EDUCATION/TRAINING PROGRAM

## 2024-10-16 PROCEDURE — 93005 ELECTROCARDIOGRAM TRACING: CPT

## 2024-10-16 PROCEDURE — 76705 ECHO EXAM OF ABDOMEN: CPT

## 2024-10-16 PROCEDURE — 6360000004 HC RX CONTRAST MEDICATION: Performed by: PHYSICIAN ASSISTANT

## 2024-10-16 PROCEDURE — 83540 ASSAY OF IRON: CPT

## 2024-10-16 PROCEDURE — 99232 SBSQ HOSP IP/OBS MODERATE 35: CPT | Performed by: HOSPITALIST

## 2024-10-16 PROCEDURE — 2580000003 HC RX 258: Performed by: STUDENT IN AN ORGANIZED HEALTH CARE EDUCATION/TRAINING PROGRAM

## 2024-10-16 PROCEDURE — 82728 ASSAY OF FERRITIN: CPT

## 2024-10-16 PROCEDURE — 93010 ELECTROCARDIOGRAM REPORT: CPT | Performed by: INTERNAL MEDICINE

## 2024-10-16 PROCEDURE — 6370000000 HC RX 637 (ALT 250 FOR IP): Performed by: STUDENT IN AN ORGANIZED HEALTH CARE EDUCATION/TRAINING PROGRAM

## 2024-10-16 PROCEDURE — 1100000000 HC RM PRIVATE

## 2024-10-16 PROCEDURE — 85025 COMPLETE CBC W/AUTO DIFF WBC: CPT

## 2024-10-16 PROCEDURE — 6370000000 HC RX 637 (ALT 250 FOR IP): Performed by: HOSPITALIST

## 2024-10-16 PROCEDURE — 85730 THROMBOPLASTIN TIME PARTIAL: CPT

## 2024-10-16 PROCEDURE — 83605 ASSAY OF LACTIC ACID: CPT

## 2024-10-16 PROCEDURE — 80053 COMPREHEN METABOLIC PANEL: CPT

## 2024-10-16 PROCEDURE — 84478 ASSAY OF TRIGLYCERIDES: CPT

## 2024-10-16 PROCEDURE — 74183 MRI ABD W/O CNTR FLWD CNTR: CPT

## 2024-10-16 PROCEDURE — 83550 IRON BINDING TEST: CPT

## 2024-10-16 PROCEDURE — A9577 INJ MULTIHANCE: HCPCS | Performed by: PHYSICIAN ASSISTANT

## 2024-10-16 PROCEDURE — 84145 PROCALCITONIN (PCT): CPT

## 2024-10-16 PROCEDURE — 36415 COLL VENOUS BLD VENIPUNCTURE: CPT

## 2024-10-16 RX ORDER — GAUZE BANDAGE 2" X 2"
100 BANDAGE TOPICAL DAILY
Status: DISCONTINUED | OUTPATIENT
Start: 2024-10-16 | End: 2024-10-18 | Stop reason: HOSPADM

## 2024-10-16 RX ORDER — MULTIVITAMIN WITH IRON
1 TABLET ORAL DAILY
Status: DISCONTINUED | OUTPATIENT
Start: 2024-10-16 | End: 2024-10-18 | Stop reason: HOSPADM

## 2024-10-16 RX ORDER — NICOTINE 21 MG/24HR
1 PATCH, TRANSDERMAL 24 HOURS TRANSDERMAL DAILY
Status: DISCONTINUED | OUTPATIENT
Start: 2024-10-16 | End: 2024-10-18 | Stop reason: HOSPADM

## 2024-10-16 RX ADMIN — GADOBENATE DIMEGLUMINE 12 ML: 529 INJECTION, SOLUTION INTRAVENOUS at 18:12

## 2024-10-16 RX ADMIN — HEPARIN SODIUM 4400 UNITS: 1000 INJECTION INTRAVENOUS; SUBCUTANEOUS at 10:29

## 2024-10-16 RX ADMIN — MORPHINE SULFATE 4 MG: 4 INJECTION, SOLUTION INTRAMUSCULAR; INTRAVENOUS at 19:56

## 2024-10-16 RX ADMIN — MORPHINE SULFATE 4 MG: 4 INJECTION, SOLUTION INTRAMUSCULAR; INTRAVENOUS at 23:07

## 2024-10-16 RX ADMIN — SODIUM CHLORIDE, POTASSIUM CHLORIDE, SODIUM LACTATE AND CALCIUM CHLORIDE: 600; 310; 30; 20 INJECTION, SOLUTION INTRAVENOUS at 06:57

## 2024-10-16 RX ADMIN — MORPHINE SULFATE 4 MG: 4 INJECTION, SOLUTION INTRAMUSCULAR; INTRAVENOUS at 03:01

## 2024-10-16 RX ADMIN — SODIUM CHLORIDE, POTASSIUM CHLORIDE, SODIUM LACTATE AND CALCIUM CHLORIDE: 600; 310; 30; 20 INJECTION, SOLUTION INTRAVENOUS at 03:10

## 2024-10-16 RX ADMIN — SODIUM CHLORIDE, POTASSIUM CHLORIDE, SODIUM LACTATE AND CALCIUM CHLORIDE: 600; 310; 30; 20 INJECTION, SOLUTION INTRAVENOUS at 12:24

## 2024-10-16 RX ADMIN — SODIUM CHLORIDE, PRESERVATIVE FREE 10 ML: 5 INJECTION INTRAVENOUS at 09:26

## 2024-10-16 RX ADMIN — HEPARIN SODIUM 18 UNITS/KG/HR: 10000 INJECTION, SOLUTION INTRAVENOUS at 00:04

## 2024-10-16 RX ADMIN — LABETALOL HYDROCHLORIDE 10 MG: 5 INJECTION, SOLUTION INTRAVENOUS at 00:14

## 2024-10-16 RX ADMIN — Medication 100 MG: at 14:24

## 2024-10-16 RX ADMIN — THERA TABS 1 TABLET: TAB at 14:23

## 2024-10-16 RX ADMIN — SODIUM CHLORIDE, PRESERVATIVE FREE 10 ML: 5 INJECTION INTRAVENOUS at 20:12

## 2024-10-16 RX ADMIN — SODIUM CHLORIDE, PRESERVATIVE FREE 5 ML: 5 INJECTION INTRAVENOUS at 03:11

## 2024-10-16 RX ADMIN — MORPHINE SULFATE 4 MG: 4 INJECTION, SOLUTION INTRAMUSCULAR; INTRAVENOUS at 07:00

## 2024-10-16 RX ADMIN — SODIUM CHLORIDE, POTASSIUM CHLORIDE, SODIUM LACTATE AND CALCIUM CHLORIDE: 600; 310; 30; 20 INJECTION, SOLUTION INTRAVENOUS at 18:52

## 2024-10-16 RX ADMIN — MORPHINE SULFATE 4 MG: 4 INJECTION, SOLUTION INTRAMUSCULAR; INTRAVENOUS at 12:25

## 2024-10-16 RX ADMIN — SODIUM CHLORIDE, POTASSIUM CHLORIDE, SODIUM LACTATE AND CALCIUM CHLORIDE: 600; 310; 30; 20 INJECTION, SOLUTION INTRAVENOUS at 09:25

## 2024-10-16 ASSESSMENT — PAIN SCALES - GENERAL
PAINLEVEL_OUTOF10: 8
PAINLEVEL_OUTOF10: 0
PAINLEVEL_OUTOF10: 7
PAINLEVEL_OUTOF10: 0
PAINLEVEL_OUTOF10: 8
PAINLEVEL_OUTOF10: 0
PAINLEVEL_OUTOF10: 8
PAINLEVEL_OUTOF10: 8
PAINLEVEL_OUTOF10: 0

## 2024-10-16 ASSESSMENT — PAIN DESCRIPTION - DESCRIPTORS
DESCRIPTORS: ACHING
DESCRIPTORS: ACHING
DESCRIPTORS: ACHING;STABBING
DESCRIPTORS: CRAMPING
DESCRIPTORS: SHARP

## 2024-10-16 ASSESSMENT — PAIN DESCRIPTION - ONSET
ONSET: ON-GOING

## 2024-10-16 ASSESSMENT — PAIN DESCRIPTION - FREQUENCY
FREQUENCY: CONTINUOUS

## 2024-10-16 ASSESSMENT — PAIN DESCRIPTION - ORIENTATION
ORIENTATION: LOWER
ORIENTATION: LOWER
ORIENTATION: RIGHT;UPPER
ORIENTATION: LOWER
ORIENTATION: RIGHT;UPPER

## 2024-10-16 ASSESSMENT — PAIN DESCRIPTION - PAIN TYPE
TYPE: ACUTE PAIN
TYPE: CHRONIC PAIN
TYPE: ACUTE PAIN

## 2024-10-16 ASSESSMENT — PAIN DESCRIPTION - LOCATION
LOCATION: ABDOMEN
LOCATION: ABDOMEN;CHEST
LOCATION: ABDOMEN
LOCATION: ABDOMEN
LOCATION: ABDOMEN;CHEST

## 2024-10-16 ASSESSMENT — PAIN - FUNCTIONAL ASSESSMENT
PAIN_FUNCTIONAL_ASSESSMENT: ACTIVITIES ARE NOT PREVENTED
PAIN_FUNCTIONAL_ASSESSMENT: ACTIVITIES ARE NOT PREVENTED
PAIN_FUNCTIONAL_ASSESSMENT: PREVENTS OR INTERFERES WITH ALL ACTIVE AND SOME PASSIVE ACTIVITIES
PAIN_FUNCTIONAL_ASSESSMENT: PREVENTS OR INTERFERES WITH ALL ACTIVE AND SOME PASSIVE ACTIVITIES
PAIN_FUNCTIONAL_ASSESSMENT: ACTIVITIES ARE NOT PREVENTED

## 2024-10-16 NOTE — PLAN OF CARE
Problem: Pain  Goal: Verbalizes/displays adequate comfort level or baseline comfort level  10/16/2024 1339 by Lenora Roe, RN  Outcome: Progressing  10/16/2024 0336 by Nathen Morgan, RN  Outcome: Progressing     Problem: Safety - Adult  Goal: Free from fall injury  10/16/2024 1339 by Lenora Roe, RN  Outcome: Progressing  10/16/2024 0336 by Nathen Morgan, RN  Outcome: Progressing

## 2024-10-16 NOTE — CONSULTS
[email or web address]  [phone]    GASTROENTEROLOGY CONSULT      Impression:   1. Acute on chronic pancreatitis - secondary to EtOH, Lipase 299  - US negative for cholelithiasis  - triglycerides wnl  2. Abdominal pain  3. Abnormal imaging of pancreas - pseudocyst, limited eval due to lack of contrast  4. Hx DVT - not on xarelto x1 month due to cost  5. Hx Alcohol use disorder - denies any current alcohol use  6. Hx open distal pancreatectomy + splenectomy - 2018- for \"chronic pancreatic tail pseudocyst with 2 recent admissions due to pseudocyst related bleeding and acute anemia\"  - Path: A. DISTAL PANCREAS, SPLEEN, DISTAL PANCREATECTOMY:   - PANCREATIC PSEUDOCYST ARISING IN THE BACKGROUND OF CHRONIC     PANCREATITIS, 3.0 CM WITH ADHESIONS TO SPLEEN.   - SPLENIC HEMORRHAGE WITH SURROUNDING FIBROSIS, 10.0 CM, SEE COMMENT.   - TEN BENIGN LYMPH NODES.   7. Hx Thrombocytosis - secondary to #6  8. Underweight - consider stool testing for fecal fat/elastase to r/o EPI from chronic pancreatitis    Plan:     1. Given current national IVF shortage, defer management of IVF to hospitalist, clinical course and patient's ability to take adequate fluids PO - Typically recommend 2 L bolus IVF upon presentation then 250-300mL/hr x 12-24hrs. LR favored. No data to guide fluid therapy after first 24hrs. Monitor fluid status and UO.   2. Assure that Hematocrit / BUN decrease and increase IVF accordingly. Failure to decrease 4 points in Hct within initial 24hrs was related to bad prognosis.   3. Clear liquid diet, advance diet as tolerated as he doesn't have signs of ileus. Alcohol and fatty food should be avoided.  4. Pain control / Antiemetics per primary team  5. Continue to trend CMP, PT/INR. No need to trend lipase.  6. MRI/MRCP ordered - discussed documented allergy, patient states allergy to seafood w/ manifestation of \"sleepyness.\" Denies history of any reaction w/ IV contrast including no hives or anaphylaxis and has previously  7:30-4:30)  [email or web address]

## 2024-10-16 NOTE — PROGRESS NOTES
6.1 (L) 6.4 - 8.2 g/dL    Albumin 3.3 (L) 3.4 - 5.0 g/dL    Globulin 2.8 2.0 - 4.0 g/dL    Albumin/Globulin Ratio 1.2 0.8 - 1.7     Lactic Acid    Collection Time: 10/16/24  9:11 AM   Result Value Ref Range    Lactic Acid, Plasma 1.1 0.4 - 2.0 MMOL/L   CBC with Auto Differential    Collection Time: 10/16/24  9:11 AM   Result Value Ref Range    WBC 9.5 4.6 - 13.2 K/uL    RBC 4.05 (L) 4.35 - 5.65 M/uL    Hemoglobin 10.1 (L) 13.0 - 16.0 g/dL    Hematocrit 30.5 (L) 36.0 - 48.0 %    MCV 75.3 (L) 78.0 - 100.0 FL    MCH 24.9 24.0 - 34.0 PG    MCHC 33.1 31.0 - 37.0 g/dL    RDW 15.4 (H) 11.6 - 14.5 %    Platelets 357 135 - 420 K/uL    MPV 9.3 9.2 - 11.8 FL    Nucleated RBCs 0.3 (H) 0  WBC    nRBC 0.03 (H) 0.00 - 0.01 K/uL    Neutrophils % 62 40 - 73 %    Lymphocytes % 26 21 - 52 %    Monocytes % 10 3 - 10 %    Eosinophils % 1 0 - 5 %    Basophils % 0 0 - 2 %    Immature Granulocytes % 0 0.0 - 0.5 %    Neutrophils Absolute 5.9 1.8 - 8.0 K/UL    Lymphocytes Absolute 2.4 0.9 - 3.6 K/UL    Monocytes Absolute 1.0 0.05 - 1.2 K/UL    Eosinophils Absolute 0.1 0.0 - 0.4 K/UL    Basophils Absolute 0.0 0.0 - 0.1 K/UL    Immature Granulocytes Absolute 0.0 0.00 - 0.04 K/UL    Differential Type AUTOMATED     Procalcitonin    Collection Time: 10/16/24  9:11 AM   Result Value Ref Range    Procalcitonin <0.05 ng/mL   APTT    Collection Time: 10/16/24  9:11 AM   Result Value Ref Range    APTT 53.4 (H) 23.0 - 36.4 SEC   Iron and TIBC    Collection Time: 10/16/24  9:11 AM   Result Value Ref Range    Iron 61 50 - 175 ug/dL    TIBC 217 (L) 250 - 450 ug/dL    Iron % Saturation 28 20 - 50 %   Ferritin    Collection Time: 10/16/24  9:11 AM   Result Value Ref Range    Ferritin 262 8 - 388 NG/ML       Signed By: Jimmy Weston MD     October 16, 2024      I spent 35 minutes with the patient in face-to-face consultation, of which greater than 50% was spent in counseling and coordination of care as described above    Disclaimer: Sections of  this note are dictated using utilizing voice recognition software.  Minor typographical errors may be present. If questions arise, please do not hesitate to contact me or call our department.

## 2024-10-16 NOTE — H&P
Hospitalist Admission History and Physical    NAME:  Misael Plascencia   :   1970   MRN:   [id number]     PCP:  Connor Fonseca MD  Date/Time:  10/15/2024 10:20 PM  Subjective:   CHIEF COMPLAINT: Back pain    HISTORY OF PRESENT ILLNESS:     Misael Plascencia is a 54 y.o. who male  has a past medical history of Hypertension, Pancreatitis, and Thromboembolus (HCC)..  He presented with back pain.  Patient states her last several days he has had low back pain/mid back pain with radiation to his abdomen and now up to his chest.  He took 2 Tylenol's at home with no relief.  He rates the pain a 7 out of 10 and morphine helps take the edge off.  He also has a history of DVT for which he was taking Xarelto approximately 1 month ago until he states he could no longer afford it.  States he is taking a baby aspirin daily.  States he has this pain intermittently and has not improved with heating pads and rest.  He has noted some shortness of breath.  States that he did lower himself/fall to the ground at home secondary to pain.  He denies loss of consciousness or head injury.  Last dose of Xarelto was approximately 1 month ago.  He denies any fevers or chills.  Denies any nausea or vomiting.  States his last bowel movement was earlier today denies melena or hematochezia.  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  He smokes 1/3 pack a day of cigarettes.  He stopped drinking about 2-1/2 months ago.  Patient still has right groin pain.    When patient presented to the ED, pulse 100, blood pressure 154/128.  Labs significant for lactic acid 2.39, WBC 13.8, hemoglobin 11.9, MCV 75.5. CT chest abdomen pelvis shows suspicion for acute pancreatitis with mild haziness surrounding the pancreas, 2.9 cm rounded hypodensity within the pancreatic head, biliary sludge without evidence for

## 2024-10-16 NOTE — PROGRESS NOTES
MRI screening form needs to be filled out and faxed to  [phone] BEFORE MRI can be scheduled.  If unable to obtain information from patient , MPOA needs to be contacted .   If patient is claustrophobic or will needs pain meds, please have ordered in advance in order to facilitate exam.      If POA is unavailable or unsure of patient history, screening X-rays will need to be ordered.

## 2024-10-16 NOTE — PROGRESS NOTES
Pt discussed with this RN that he has been on and off with his insurance and cannot afford xarelto and his antihypertensive medication.    He would like to talk to the

## 2024-10-16 NOTE — DISCHARGE INSTR - DIET

## 2024-10-16 NOTE — ED NOTES
Assumed care of patient. Patient laying semi calderon position, blanket pulled up to chest. Vital signs monitored by bedside monitor. Skin is warm and dry to touch. No respiratory distress observed. IV's flushed, saline locked.

## 2024-10-16 NOTE — PROGRESS NOTES
4 Eyes Skin Assessment     NAME:  Misael Plascencia  YOB: 1970  MEDICAL RECORD NUMBER:  [id number]    The patient is being assessed for  Admission    I agree that at least one RN has performed a thorough Head to Toe Skin Assessment on the patient. ALL assessment sites listed below have been assessed.      Areas assessed by both nurses:    Head, Face, Ears, Shoulders, Back, Chest, Arms, Elbows, Hands, Sacrum. Buttock, Coccyx, Ischium, Legs. Feet and Heels, and Under Medical Devices         Does the Patient have a Wound? No noted wound(s)       Jr Prevention initiated by RN: No  Wound Care Orders initiated by RN: No    Pressure Injury (Stage 3,4, Unstageable, DTI, NWPT, and Complex wounds) if present, place Wound referral order by RN under : No    New Ostomies, if present place, Ostomy referral order under : No     Nurse 1 eSignature: Electronically signed by Nathen Morgan RN on 10/16/24 at 3:57 AM EDT    **SHARE this note so that the co-signing nurse can place an eSignature**    Nurse 2 eSignature: Electronically signed by Erwin Garner RN on 10/16/24 at 4:54 AM EDT

## 2024-10-16 NOTE — PROGRESS NOTES
Comprehensive Nutrition Assessment    Type and Reason for Visit:  Initial, Positive Nutrition Screen    Nutrition Recommendations/Plan:   Continue Clear Liquid as tolerated, advance as tolerated when medically feasible.  Order Gelatein 20 (each provides 80 kcal, 20g protein) BID  Recommend/order MVI/min and Thiamine supplementation daily, Daily wts.  Continue to monitor tolerance of PO, compliance of oral supplements, weight, labs, and plan of care during admission.     Malnutrition Assessment:  Malnutrition Status:  Mild malnutrition (10/16/24 1213)    Context:  Acute Illness     Findings of the 6 clinical characteristics of malnutrition:  Energy Intake:  Mild decrease in energy intake (Comment) (Appetite fluctuates with pain)  Weight Loss:  7.5% over 3 months     Body Fat Loss:  No significant body fat loss     Muscle Mass Loss:  No significant muscle mass loss    Fluid Accumulation:  No significant fluid accumulation     Strength:  Not Performed    Nutrition History and Allergies:      Past Medical History:   Diagnosis Date    Hypertension     Pancreatitis     Thromboembolus (HCC)      PTA: Per pt appetite fluctuates with pain level, has had decreased appetite a few days PTA. Most days consumes 2 large meals. Pt endorses always being thin and low weight; stating the heaviest has been 160 lbs. Pt remarks usually looses weight in the winter and gains in the summer. Pt reports  lbs, with 10 lbs unintentional weight loss over the past 2-3 months.     Weight Hx: 130 lbs Wt change: -10 lb (-7.7%) x 3 months - significant. Per pt weight hx recall.  Wt Readings from Last 10 Encounters:   10/15/24 54.7 kg (120 lb 8 oz)   05/12/23 67.1 kg (148 lb)   01/12/23 56.7 kg (125 lb)   06/08/22 60.8 kg (134 lb)   05/17/22 61.7 kg (136 lb)   03/09/22 63.5 kg (140 lb)     NFPE: Visual NFPE conducted only; pt declined NFPE Food Allergies: [facility]    Nutrition Assessment:    Admitted for acute recurrent pancreatitis; s/p

## 2024-10-16 NOTE — CARE COORDINATION
10/16/24 7299   Service Assessment   Patient Orientation Alert and Oriented;Person;Place;Situation;Self   Cognition Alert   History Provided By Patient   Primary Caregiver Self   Accompanied By/Relationship Patient friend was at bedside with patient   Support Systems Spouse/Significant Other;Children   Patient's Healthcare Decision Maker is: Legal Next of Kin   PCP Verified by CM Yes  (Connor Clark)   Last Visit to PCP Within last 3 months   Prior Functional Level Cooking;Housework;Shopping   Current Functional Level Assistance with the following:;Housework;Shopping;Cooking   Can patient return to prior living arrangement Yes   Ability to make needs known: Good   Family able to assist with home care needs: Yes   Would you like for me to discuss the discharge plan with any other family members/significant others, and if so, who? Yes  (Spouse)   Financial Resources Other (Comment)  (Patient stated \"he has [employer] insurance under his wife.\")   Community Resources None   CM/SW Referral Other (see comment)  (Discharge Planning)   Social/Functional History   Lives With Spouse;Family   Type of Home House   Home Layout One level   Home Access Level entry   Bathroom Shower/Tub Tub/Shower unit   Bathroom Toilet Standard   Bathroom Equipment Shower chair   Bathroom Accessibility Accessible   Home Equipment Crutches   Receives Help From Family   ADL Assistance Independent   Homemaking Assistance Independent   Homemaking Responsibilities Yes   Ambulation Assistance Independent   Transfer Assistance Independent   Active  Yes   Mode of Transportation Car   Education Not Applicable   Type of Occupation Not Applicable   Discharge Planning   Type of Residence House   Living Arrangements Spouse/Significant Other;Children   Current Services Prior To Admission None   Potential Assistance Needed N/A   DME Ordered? Other (comment)   Potential Assistance Purchasing Medications No   Type of Home Care Services None

## 2024-10-16 NOTE — PROGRESS NOTES
Advance Care Planning   Healthcare Decision Maker:    Primary Decision Maker: Yanet Plascencia - Spouse - [phone]    Today we documented Decision Maker(s) consistent with Legal Next of Kin hierarchy.     Spiritual Health History and Assessment/Progress Note  [facility]    Spiritual/Emotional Needs,  ,  ,      Name: Misael Plascencia MRN: [id number]    Age: 54 y.o.     Sex: male   Language: English   Judaism: Hoahaoism   Acute recurrent pancreatitis     Date: 10/16/2024            Total Time Calculated: (P) 7 min              Spiritual Assessment began in [address] MEDICAL        Referral/Consult From: Multi-disciplinary team   Encounter Overview/Reason: Spiritual/Emotional Needs  Service Provided For: Patient    Saritha, Belief, Meaning:   Patient has beliefs or practices that help with coping during difficult times  Family/Friends No family/friends present      Importance and Influence:  Patient has spiritual/personal beliefs that influence decisions regarding their health  Family/Friends No family/friends present    Community:  Patient feels well-supported. Support system includes: Spouse/Partner  Family/Friends No family/friends present    Assessment and Plan of Care:     Patient Interventions include: Facilitated expression of thoughts and feelings and Affirmed coping skills/support systems  Family/Friends Interventions include: No family/friends present    Patient Plan of Care: Spiritual Care available upon further referral  Family/Friends Plan of Care: No family/friends present    Electronically signed by MADDIE Parnell on 10/16/2024 at 4:37 PM

## 2024-10-16 NOTE — CARE COORDINATION
Patient requested to speak with  to update regarding patient insurance    Patient called wife and informed CM that patient do not have insurance at this time,[facility].      Patient will need assistance with prescription medication.      Jc FERNANDEZW  Case Management

## 2024-10-16 NOTE — PROGRESS NOTES
MRI screening form needs to be filled out and faxed to [phone] BEFORE MRI can be scheduled.  If unable to obtain information from patient , MPOA needs to be contacted . If patient is claustrophobic or will needs pain meds, please have ordered in advance in order to facilitate exam.

## 2024-10-17 PROBLEM — R78.81 BACTEREMIA DUE TO GRAM-POSITIVE BACTERIA: Status: ACTIVE | Noted: 2024-10-17

## 2024-10-17 LAB
ACB COMPLEX DNA BLD POS QL NAA+NON-PROBE: NOT DETECTED
ACCESSION NUMBER, LLC1M: ABNORMAL
ALBUMIN SERPL-MCNC: 3.4 G/DL (ref 3.4–5)
ALBUMIN/GLOB SERPL: 1.2 (ref 0.8–1.7)
ALP SERPL-CCNC: 48 U/L (ref 45–117)
ALT SERPL-CCNC: 15 U/L (ref 16–61)
ANION GAP SERPL CALC-SCNC: 3 MMOL/L (ref 3–18)
AST SERPL-CCNC: 10 U/L (ref 10–38)
B FRAGILIS DNA BLD POS QL NAA+NON-PROBE: NOT DETECTED
BASOPHILS # BLD: 0 K/UL (ref 0–0.1)
BASOPHILS NFR BLD: 0 % (ref 0–2)
BILIRUB SERPL-MCNC: 0.6 MG/DL (ref 0.2–1)
BIOFIRE TEST COMMENT: ABNORMAL
BUN SERPL-MCNC: 7 MG/DL (ref 7–18)
BUN/CREAT SERPL: 13 (ref 12–20)
C ALBICANS DNA BLD POS QL NAA+NON-PROBE: NOT DETECTED
C AURIS DNA BLD POS QL NAA+NON-PROBE: NOT DETECTED
C GATTII+NEOFOR DNA BLD POS QL NAA+N-PRB: NOT DETECTED
C GLABRATA DNA BLD POS QL NAA+NON-PROBE: NOT DETECTED
C KRUSEI DNA BLD POS QL NAA+NON-PROBE: NOT DETECTED
C PARAP DNA BLD POS QL NAA+NON-PROBE: NOT DETECTED
C TROPICLS DNA BLD POS QL NAA+NON-PROBE: NOT DETECTED
CALCIUM SERPL-MCNC: 8.7 MG/DL (ref 8.5–10.1)
CHLORIDE SERPL-SCNC: 105 MMOL/L (ref 100–111)
CO2 SERPL-SCNC: 28 MMOL/L (ref 21–32)
CREAT SERPL-MCNC: 0.56 MG/DL (ref 0.6–1.3)
DIFFERENTIAL METHOD BLD: ABNORMAL
E CLOAC COMP DNA BLD POS NAA+NON-PROBE: NOT DETECTED
E COLI DNA BLD POS QL NAA+NON-PROBE: NOT DETECTED
E FAECALIS DNA BLD POS QL NAA+NON-PROBE: NOT DETECTED
E FAECIUM DNA BLD POS QL NAA+NON-PROBE: NOT DETECTED
ENTEROBACTERALES DNA BLD POS NAA+N-PRB: NOT DETECTED
EOSINOPHIL # BLD: 0.3 K/UL (ref 0–0.4)
EOSINOPHIL NFR BLD: 4 % (ref 0–5)
ERYTHROCYTE [DISTWIDTH] IN BLOOD BY AUTOMATED COUNT: 15.3 % (ref 11.6–14.5)
GLOBULIN SER CALC-MCNC: 2.9 G/DL (ref 2–4)
GLUCOSE SERPL-MCNC: 79 MG/DL (ref 74–99)
GP B STREP DNA BLD POS QL NAA+NON-PROBE: NOT DETECTED
HAEM INFLU DNA BLD POS QL NAA+NON-PROBE: NOT DETECTED
HCT VFR BLD AUTO: 29.2 % (ref 36–48)
HGB BLD-MCNC: 9.7 G/DL (ref 13–16)
IMM GRANULOCYTES # BLD AUTO: 0 K/UL (ref 0–0.04)
IMM GRANULOCYTES NFR BLD AUTO: 0 % (ref 0–0.5)
K OXYTOCA DNA BLD POS QL NAA+NON-PROBE: NOT DETECTED
KLEBSIELLA SP DNA BLD POS QL NAA+NON-PRB: NOT DETECTED
KLEBSIELLA SP DNA BLD POS QL NAA+NON-PRB: NOT DETECTED
L MONOCYTOG DNA BLD POS QL NAA+NON-PROBE: NOT DETECTED
LYMPHOCYTES # BLD: 2.7 K/UL (ref 0.9–3.6)
LYMPHOCYTES NFR BLD: 36 % (ref 21–52)
MCH RBC QN AUTO: 25.1 PG (ref 24–34)
MCHC RBC AUTO-ENTMCNC: 33.2 G/DL (ref 31–37)
MCV RBC AUTO: 75.5 FL (ref 78–100)
MONOCYTES # BLD: 1 K/UL (ref 0.05–1.2)
MONOCYTES NFR BLD: 13 % (ref 3–10)
N MEN DNA BLD POS QL NAA+NON-PROBE: NOT DETECTED
NEUTS SEG # BLD: 3.6 K/UL (ref 1.8–8)
NEUTS SEG NFR BLD: 47 % (ref 40–73)
NRBC # BLD: 0.03 K/UL (ref 0–0.01)
NRBC BLD-RTO: 0.4 PER 100 WBC
P AERUGINOSA DNA BLD POS NAA+NON-PROBE: NOT DETECTED
PLATELET # BLD AUTO: 361 K/UL (ref 135–420)
PMV BLD AUTO: 9.8 FL (ref 9.2–11.8)
POTASSIUM SERPL-SCNC: 4.2 MMOL/L (ref 3.5–5.5)
PROT SERPL-MCNC: 6.3 G/DL (ref 6.4–8.2)
PROTEUS SP DNA BLD POS QL NAA+NON-PROBE: NOT DETECTED
RBC # BLD AUTO: 3.87 M/UL (ref 4.35–5.65)
RESISTANT GENE TARGETS: ABNORMAL
S AUREUS DNA BLD POS QL NAA+NON-PROBE: NOT DETECTED
S AUREUS+CONS DNA BLD POS NAA+NON-PROBE: DETECTED
S EPIDERMIDIS DNA BLD POS QL NAA+NON-PRB: NOT DETECTED
S LUGDUNENSIS DNA BLD POS QL NAA+NON-PRB: NOT DETECTED
S MALTOPHILIA DNA BLD POS QL NAA+NON-PRB: NOT DETECTED
S MARCESCENS DNA BLD POS NAA+NON-PROBE: NOT DETECTED
S PNEUM DNA BLD POS QL NAA+NON-PROBE: NOT DETECTED
S PYO DNA BLD POS QL NAA+NON-PROBE: NOT DETECTED
SALMONELLA DNA BLD POS QL NAA+NON-PROBE: NOT DETECTED
SODIUM SERPL-SCNC: 136 MMOL/L (ref 136–145)
STREPTOCOCCUS DNA BLD POS NAA+NON-PROBE: NOT DETECTED
WBC # BLD AUTO: 7.6 K/UL (ref 4.6–13.2)

## 2024-10-17 PROCEDURE — 6370000000 HC RX 637 (ALT 250 FOR IP): Performed by: HOSPITALIST

## 2024-10-17 PROCEDURE — 85025 COMPLETE CBC W/AUTO DIFF WBC: CPT

## 2024-10-17 PROCEDURE — 99232 SBSQ HOSP IP/OBS MODERATE 35: CPT | Performed by: HOSPITALIST

## 2024-10-17 PROCEDURE — 6370000000 HC RX 637 (ALT 250 FOR IP): Performed by: STUDENT IN AN ORGANIZED HEALTH CARE EDUCATION/TRAINING PROGRAM

## 2024-10-17 PROCEDURE — 80053 COMPREHEN METABOLIC PANEL: CPT

## 2024-10-17 PROCEDURE — 2580000003 HC RX 258: Performed by: STUDENT IN AN ORGANIZED HEALTH CARE EDUCATION/TRAINING PROGRAM

## 2024-10-17 PROCEDURE — 36415 COLL VENOUS BLD VENIPUNCTURE: CPT

## 2024-10-17 PROCEDURE — 1100000000 HC RM PRIVATE

## 2024-10-17 PROCEDURE — 6360000002 HC RX W HCPCS: Performed by: STUDENT IN AN ORGANIZED HEALTH CARE EDUCATION/TRAINING PROGRAM

## 2024-10-17 RX ORDER — VANCOMYCIN 1.75 G/350ML
1250 INJECTION, SOLUTION INTRAVENOUS ONCE
Status: DISCONTINUED | OUTPATIENT
Start: 2024-10-17 | End: 2024-10-17

## 2024-10-17 RX ORDER — ENOXAPARIN SODIUM 100 MG/ML
40 INJECTION SUBCUTANEOUS DAILY
Status: DISCONTINUED | OUTPATIENT
Start: 2024-10-17 | End: 2024-10-17

## 2024-10-17 RX ADMIN — SODIUM CHLORIDE, POTASSIUM CHLORIDE, SODIUM LACTATE AND CALCIUM CHLORIDE: 600; 310; 30; 20 INJECTION, SOLUTION INTRAVENOUS at 21:59

## 2024-10-17 RX ADMIN — THERA TABS 1 TABLET: TAB at 08:20

## 2024-10-17 RX ADMIN — MORPHINE SULFATE 4 MG: 4 INJECTION, SOLUTION INTRAMUSCULAR; INTRAVENOUS at 05:38

## 2024-10-17 RX ADMIN — MORPHINE SULFATE 4 MG: 4 INJECTION, SOLUTION INTRAMUSCULAR; INTRAVENOUS at 14:05

## 2024-10-17 RX ADMIN — MORPHINE SULFATE 4 MG: 4 INJECTION, SOLUTION INTRAMUSCULAR; INTRAVENOUS at 02:16

## 2024-10-17 RX ADMIN — SODIUM CHLORIDE, PRESERVATIVE FREE 10 ML: 5 INJECTION INTRAVENOUS at 20:31

## 2024-10-17 RX ADMIN — Medication 100 MG: at 08:20

## 2024-10-17 RX ADMIN — MORPHINE SULFATE 4 MG: 4 INJECTION, SOLUTION INTRAMUSCULAR; INTRAVENOUS at 20:27

## 2024-10-17 RX ADMIN — MORPHINE SULFATE 4 MG: 4 INJECTION, SOLUTION INTRAMUSCULAR; INTRAVENOUS at 08:27

## 2024-10-17 RX ADMIN — SODIUM CHLORIDE, POTASSIUM CHLORIDE, SODIUM LACTATE AND CALCIUM CHLORIDE: 600; 310; 30; 20 INJECTION, SOLUTION INTRAVENOUS at 14:06

## 2024-10-17 RX ADMIN — SODIUM CHLORIDE, POTASSIUM CHLORIDE, SODIUM LACTATE AND CALCIUM CHLORIDE: 600; 310; 30; 20 INJECTION, SOLUTION INTRAVENOUS at 08:26

## 2024-10-17 RX ADMIN — SODIUM CHLORIDE, POTASSIUM CHLORIDE, SODIUM LACTATE AND CALCIUM CHLORIDE: 600; 310; 30; 20 INJECTION, SOLUTION INTRAVENOUS at 01:30

## 2024-10-17 RX ADMIN — RIVAROXABAN 20 MG: 20 TABLET, FILM COATED ORAL at 17:04

## 2024-10-17 ASSESSMENT — PAIN DESCRIPTION - ORIENTATION
ORIENTATION: LOWER
ORIENTATION: LOWER
ORIENTATION: MID
ORIENTATION: LOWER
ORIENTATION: LOWER

## 2024-10-17 ASSESSMENT — PAIN DESCRIPTION - LOCATION
LOCATION: ABDOMEN

## 2024-10-17 ASSESSMENT — PAIN DESCRIPTION - FREQUENCY
FREQUENCY: CONTINUOUS

## 2024-10-17 ASSESSMENT — PAIN DESCRIPTION - DESCRIPTORS
DESCRIPTORS: CRAMPING
DESCRIPTORS: CRAMPING
DESCRIPTORS: SHARP
DESCRIPTORS: SHARP
DESCRIPTORS: DISCOMFORT

## 2024-10-17 ASSESSMENT — PAIN DESCRIPTION - PAIN TYPE
TYPE: ACUTE PAIN

## 2024-10-17 ASSESSMENT — PAIN DESCRIPTION - ONSET
ONSET: ON-GOING

## 2024-10-17 ASSESSMENT — PAIN SCALES - GENERAL
PAINLEVEL_OUTOF10: 7
PAINLEVEL_OUTOF10: 0
PAINLEVEL_OUTOF10: 4
PAINLEVEL_OUTOF10: 7
PAINLEVEL_OUTOF10: 0
PAINLEVEL_OUTOF10: 7
PAINLEVEL_OUTOF10: 4
PAINLEVEL_OUTOF10: 0

## 2024-10-17 ASSESSMENT — PAIN - FUNCTIONAL ASSESSMENT
PAIN_FUNCTIONAL_ASSESSMENT: ACTIVITIES ARE NOT PREVENTED
PAIN_FUNCTIONAL_ASSESSMENT: PREVENTS OR INTERFERES SOME ACTIVE ACTIVITIES AND ADLS
PAIN_FUNCTIONAL_ASSESSMENT: ACTIVITIES ARE NOT PREVENTED
PAIN_FUNCTIONAL_ASSESSMENT: ACTIVITIES ARE NOT PREVENTED
PAIN_FUNCTIONAL_ASSESSMENT: PREVENTS OR INTERFERES SOME ACTIVE ACTIVITIES AND ADLS

## 2024-10-17 NOTE — PLAN OF CARE
Problem: Pain  Goal: Verbalizes/displays adequate comfort level or baseline comfort level  10/16/2024 2233 by Gail Roque, RN  Outcome: Progressing  10/16/2024 1339 by Lenora Roe, RN  Outcome: Progressing

## 2024-10-17 NOTE — CONSULTS
Infectious Disease Consultation Note        Reason: Necrotizing pancreatitis, gram-positive bacteremia    Current abx Prior abx   Vancomycin since 10/17 Zosyn on 10/15     Lines:       Assessment :   54 y.o. who male  has a past medical history of Hypertension, Pancreatitis, and Thromboembolus (HCC), appendix perforation with subsequent surgical washout and partial pancreatectomy and partial splenectomy (few years ago at [facility]) presented to [facility] on 10/15/24  with back pain.     Clinical presentation consistent with acute necrotizing pancreatitis, gram-positive bacteremia    GI evaluation appreciated.  Currently no clinical or radiological evidence to suggest infected pancreatic necrosis.  Improving leukocytosis off antibiotics, lack of nausea/vomiting, lack of fevers would argue against this.    1/2 sets of blood cultures positive for gram-positive bacteria in blood culture 10/15-could be contaminant.  No definite evidence of skin/soft tissue infection as a source of gram-positive bacteremia.    Recommendations:    Discontinue vancomycin.  Monitor off antibiotics  Follow-up identification of gram-positive cocci in blood culture 10/15  Monitor for infected  pancreatic necrosis.  Follow-up GI recommendations.  Monitor clinically    Thank you for consultation request. Above plan was discussed in details with patient, dr. Weston and dr Krishnamurthy. Please call me if any further questions or concerns. Will continue to participate in the care of this patient.  HPI:     54 y.o. who male  has a past medical history of Hypertension, Pancreatitis, and Thromboembolus (HCC), appendix perforation with subsequent surgical washout and partial pancreatectomy and partial splenectomy (few years ago at [facility]) presented to [facility] on 10/15/24  with back pain.  Patient states her last several days he has had low back pain/mid back pain with radiation to his abdomen and now up to his chest.  When patient presented to the ED, pulse 100, blood  No suicidal or homicidal ideations, appropriate mood and affect         Labs: Results:   Chemistry Recent Labs     10/15/24  1417 10/16/24  0911 10/17/24  0453   GLUCOSE 114* 93 79   * 136 136   K 4.2 4.0 4.2    107 105   CO2 26 25 28   BUN 6* 7 7   CREATININE 0.74 0.68 0.56*   GLOB 3.3 2.8 2.9   ALT 23 18 15*   AST 19 15 10      CBC w/Diff Recent Labs     10/15/24  1417 10/16/24  0911 10/17/24  0453   WBC 13.8* 9.5 7.6   RBC 4.69 4.05* 3.87*   HGB 11.9* 10.1* 9.7*   HCT 35.4* 30.5* 29.2*    357 361      Microbiology Results       Procedure Component Value Units Date/Time    Blood Culture 2 [[id number]] Collected: 10/15/24 1830    Order Status: Completed Specimen: Blood Updated: 10/17/24 0718     Special Requests NO SPECIAL REQUESTS        Culture NO GROWTH 2 DAYS       Blood Culture 1 [[id number]] Collected: 10/15/24 1820    Order Status: Completed Specimen: Blood Updated: 10/17/24 1118     Special Requests NO SPECIAL REQUESTS        Gram Stain       AEROBIC BOTTLE Gram positive cocci IN GROUPS                  SMEAR CALLED TO AND CORRECTLY REPEATED BY: MAT KONG RN 4N ON [id number] AT 1100 TO [facility]           Culture       CULTURE IN PROGRESS,FURTHER UPDATES TO FOLLOW                  Sent to [facility] for ID/Susceptibility if indicated.          Culture, Blood, PCR ID Panel [[id number]]  (Abnormal) Collected: 10/15/24 1820    Order Status: Completed Specimen: Blood Updated: 10/17/24 1027     Accession Number [id number]     Enterococcus faecalis by PCR Not detected        Enterococcus faecium by PCR Not detected        Listeria monocytogenes by PCR Not detected        STAPHYLOCOCCUS Detected        Staphylococcus Aureus Not detected        Staphylococcus epidermidis by PCR Not detected        Staphylococcus lugdunensis by PCR Not detected        STREPTOCOCCUS Not detected        Streptococcus agalactiae (Group B) Not detected        Strep pneumoniae Not detected        Strep pyogenes,(Grp. A) Not

## 2024-10-17 NOTE — PROGRESS NOTES
[facility] Hospitalist Group  Progress Note    Patient: Misael Plascencia Age: 54 y.o. : 1970 MR#: [id number] SSN: [id number]  Date/Time: 10/17/2024     Subjective:     Patient seen evaluated, lying in bed, no acute distress.    54-year-old male with a past medical history of hypertension, pancreatitis, thromboembolic disease presents to the emergency room secondary to back pain.  ER evaluation patient noted to have a mild elevation in lipase of 299.  Patient has a history of DVT for which she was taking Xarelto but stopped since he could no longer afford it.  Patient smokes about 1/3 pack of cigarettes per day.  He stopped drinking about 2-1/2 to 3 months ago.    Patient admitted to the hospital for acute on chronic pancreatitis, GI consulted.    10/17-patient seen evaluated, lying in bed, no acute distress.  Blood cultures positive, ID consulted, likely contaminant, antibiotics stopped.  MRI abdomen reports reviewed, suggestive of acute necrotizing pancreatitis.  Patient feels better at this time.  He is having very minimal pain but tolerating clear liquid diet advance diet to be advanced.  Will advance to regular diet.        Assessment/Plan:     Acute on chronic pancreatitis, lipase mildly elevated at 299, CT abdomen pelvis reviewed, right upper quadrant ultrasound reviewed, GI consulted, MRI abdomen reviewed, suggesting acute on chronic pancreatitis, heterogeneous enhancement of the pancreas suspicious for acute necrotizing pancreatitis.  Patient mentions he is doing much better, pain has resolved, will advance diet to regular.  Positive blood cultures, likely contaminant-ID consulted, no antibiotics at this time, continue to follow.  Chronic smoker-patient has been counseled to stop  Alcohol use-patient stopped drinking alcohol about 2-1/2 to 3 months ago.  History of DVT right femoral vein in -patient was on Xarelto however stopped patient is on heparin GTT at this time,

## 2024-10-17 NOTE — PROGRESS NOTES
[facility]   Pharmacy Pharmacokinetic Monitoring Service - Vancomycin    Indication: bacteremia  Goal AUC/JERRY: 400-600  Day of Therapy: 1  Additional Antimicrobials: none    Pertinent Laboratory Values:   Wt Readings from Last 1 Encounters:   10/15/24 54.7 kg (120 lb 8 oz)     Temp Readings from Last 1 Encounters:   10/17/24 97.6 °F (36.4 °C) (Oral)     Estimated Creatinine Clearance: 115 mL/min (A) (based on SCr of 0.56 mg/dL (L)).    Recent Labs     10/16/24  0911 10/17/24  0453   CREATININE 0.68 0.56*   BUN 7 7   WBC 9.5 7.6     Pertinent Cultures:  Date Source Results   10/15 urine NGF   10/15 blood GPC in 1/2   MRSA Nasal Swab: NA    Assessment:  Date Current Dose Level (mg/L) Timing of Level (h) AUC/JERRY   10/17 1,250 mg x1  750 mg q8h - - -   Note: Serum concentrations collected for AUC dosing may appear elevated if collected in close proximity to the dose administered, this is not necessarily an indication of toxicity    Plan:  Give 1,250 mg then start a dose of 750 mg q8h  Ordered a level for 10/18 with AM labs  Pharmacy will continue to monitor patient and adjust therapy as indicated    Thank you for the consult,  Aj Sandoval RPH  10/17/2024

## 2024-10-17 NOTE — PROGRESS NOTES
[email or web address]  [phone]    Gastroenterology follow up-Progress note    Impression:  1. Acute on chronic pancreatitis - secondary to EtOH, Lipase 299 on 10/15  - US 10/15 negative for cholelithiasis but has layering sludge  - triglycerides wnl  2. Abdominal pain - improving  3. Abnormal imaging of pancreas - pseudocyst, limited eval due to lack of contrast on CT  4. Hx DVT - not on xarelto x1 month due to cost  5. Hx Alcohol use disorder - denies any current alcohol use  6. Hx open distal pancreatectomy + splenectomy - 2018- for \"chronic pancreatic tail pseudocyst with 2 recent admissions due to pseudocyst related bleeding and acute anemia\"  - Path: A. DISTAL PANCREAS, SPLEEN, DISTAL PANCREATECTOMY:   - PANCREATIC PSEUDOCYST ARISING IN THE BACKGROUND OF CHRONIC     PANCREATITIS, 3.0 CM WITH ADHESIONS TO SPLEEN.   - SPLENIC HEMORRHAGE WITH SURROUNDING FIBROSIS, 10.0 CM, SEE COMMENT.   - TEN BENIGN LYMPH NODES.   7. Hx Thrombocytosis - secondary to #6, currently normal  8. Underweight - consider stool testing for fecal fat/elastase to r/o EPI from chronic pancreatitis  9. Anemia  - H/H currently 9.7/29.2, MCV 75.5  - ferritin 262, iron 61, iron sat 28%    Plan:  1. Monitor fluid status and UO.   2. Monitor H/H and transfuse as per protocol  3. Clear liquid diet, advance diet as tolerated as he doesn't have signs of ileus. Alcohol and fatty food should be avoided.  4. Pain control / Antiemetics per primary team  5. Continue to trend CMP, PT/INR. No need to trend lipase.  6. Continued alcohol abstinence  7. May advance diet as tolerated  8. Will need follow up MRI pancreas in 4-6 weeks.  9. Patient should have routine, age-related colon cancer screening as outpatient.  10. Suspect pancreatic insufficiency given chronic pancreatitis - would recommend enzyme replacement with Creon/Zenpep when taking solids    Chief Complaint: Pancreatitis      Subjective: Improved but continue upper abdominal pain, tolerating  Specialists.  [email or web address]  Phone: [phone]  Pager: [phone]

## 2024-10-17 NOTE — PLAN OF CARE
Problem: Pain  Goal: Verbalizes/displays adequate comfort level or baseline comfort level  10/17/2024 0833 by Juliet Donaldson RN  Outcome: Progressing  10/16/2024 2233 by Gail Roque RN  Outcome: Progressing     Problem: Safety - Adult  Goal: Free from fall injury  Outcome: Progressing     Problem: ABCDS Injury Assessment  Goal: Absence of physical injury  Outcome: Progressing     Problem: Hematologic - Adult  Goal: Maintains hematologic stability  Outcome: Progressing     Problem: Nutrition Deficit:  Goal: Optimize nutritional status  Outcome: Progressing

## 2024-10-17 NOTE — PROGRESS NOTES
4 Eyes Skin Assessment     NAME:  Misael Plascencia  YOB: 1970  MEDICAL RECORD NUMBER:  [id number]    The patient is being assessed for  Shift Handoff    I agree that at least one RN has performed a thorough Head to Toe Skin Assessment on the patient. ALL assessment sites listed below have been assessed.      Areas assessed by both nurses:    Head, Face, Ears, Shoulders, Back, Chest, Arms, Elbows, Hands, Sacrum. Buttock, Coccyx, Ischium, and Legs. Feet and Heels        Does the Patient have a Wound? No noted wound(s)       Jr Prevention initiated by RN: Yes  Wound Care Orders initiated by RN: No    Pressure Injury (Stage 3,4, Unstageable, DTI, NWPT, and Complex wounds) if present, place Wound referral order by RN under : No    New Ostomies, if present place, Ostomy referral order under : No     Nurse 1 eSignature: Electronically signed by Gail Roque RN on 10/17/24 at 7:52 AM EDT    **SHARE this note so that the co-signing nurse can place an eSignature**    Nurse 2 eSignature: Electronically signed by ARIANNE LUND RN on 10/17/24 at 11:24 AM EDT

## 2024-10-18 VITALS
HEIGHT: 71 IN | TEMPERATURE: 98.3 F | RESPIRATION RATE: 20 BRPM | OXYGEN SATURATION: 100 % | WEIGHT: 120.5 LBS | BODY MASS INDEX: 16.87 KG/M2 | DIASTOLIC BLOOD PRESSURE: 95 MMHG | SYSTOLIC BLOOD PRESSURE: 164 MMHG | HEART RATE: 89 BPM

## 2024-10-18 LAB
ALBUMIN SERPL-MCNC: 3.4 G/DL (ref 3.4–5)
ALBUMIN/GLOB SERPL: 1.3 (ref 0.8–1.7)
ALP SERPL-CCNC: 52 U/L (ref 45–117)
ALT SERPL-CCNC: 16 U/L (ref 16–61)
ANION GAP SERPL CALC-SCNC: 7 MMOL/L (ref 3–18)
AST SERPL-CCNC: 13 U/L (ref 10–38)
BACTERIA SPEC CULT: ABNORMAL
BASOPHILS # BLD: 0 K/UL (ref 0–0.1)
BASOPHILS NFR BLD: 1 % (ref 0–2)
BILIRUB SERPL-MCNC: 0.6 MG/DL (ref 0.2–1)
BUN SERPL-MCNC: 8 MG/DL (ref 7–18)
BUN/CREAT SERPL: 12 (ref 12–20)
CALCIUM SERPL-MCNC: 9.1 MG/DL (ref 8.5–10.1)
CHLORIDE SERPL-SCNC: 104 MMOL/L (ref 100–111)
CO2 SERPL-SCNC: 26 MMOL/L (ref 21–32)
CREAT SERPL-MCNC: 0.65 MG/DL (ref 0.6–1.3)
DIFFERENTIAL METHOD BLD: ABNORMAL
EOSINOPHIL # BLD: 0.5 K/UL (ref 0–0.4)
EOSINOPHIL NFR BLD: 6 % (ref 0–5)
ERYTHROCYTE [DISTWIDTH] IN BLOOD BY AUTOMATED COUNT: 15.2 % (ref 11.6–14.5)
GLOBULIN SER CALC-MCNC: 2.7 G/DL (ref 2–4)
GLUCOSE SERPL-MCNC: 109 MG/DL (ref 74–99)
GRAM STN SPEC: ABNORMAL
GRAM STN SPEC: ABNORMAL
HCT VFR BLD AUTO: 29.7 % (ref 36–48)
HGB BLD-MCNC: 9.9 G/DL (ref 13–16)
IMM GRANULOCYTES # BLD AUTO: 0 K/UL (ref 0–0.04)
IMM GRANULOCYTES NFR BLD AUTO: 0 % (ref 0–0.5)
LYMPHOCYTES # BLD: 2.9 K/UL (ref 0.9–3.6)
LYMPHOCYTES NFR BLD: 36 % (ref 21–52)
MCH RBC QN AUTO: 25 PG (ref 24–34)
MCHC RBC AUTO-ENTMCNC: 33.3 G/DL (ref 31–37)
MCV RBC AUTO: 75 FL (ref 78–100)
MONOCYTES # BLD: 0.9 K/UL (ref 0.05–1.2)
MONOCYTES NFR BLD: 12 % (ref 3–10)
NEUTS SEG # BLD: 3.7 K/UL (ref 1.8–8)
NEUTS SEG NFR BLD: 46 % (ref 40–73)
NRBC # BLD: 0.03 K/UL (ref 0–0.01)
NRBC BLD-RTO: 0.4 PER 100 WBC
PLATELET # BLD AUTO: 364 K/UL (ref 135–420)
PMV BLD AUTO: 9.8 FL (ref 9.2–11.8)
POTASSIUM SERPL-SCNC: 3.9 MMOL/L (ref 3.5–5.5)
PROT SERPL-MCNC: 6.1 G/DL (ref 6.4–8.2)
RBC # BLD AUTO: 3.96 M/UL (ref 4.35–5.65)
SERVICE CMNT-IMP: ABNORMAL
SODIUM SERPL-SCNC: 137 MMOL/L (ref 136–145)
WBC # BLD AUTO: 8 K/UL (ref 4.6–13.2)

## 2024-10-18 PROCEDURE — 36415 COLL VENOUS BLD VENIPUNCTURE: CPT

## 2024-10-18 PROCEDURE — 86235 NUCLEAR ANTIGEN ANTIBODY: CPT

## 2024-10-18 PROCEDURE — 80053 COMPREHEN METABOLIC PANEL: CPT

## 2024-10-18 PROCEDURE — 6370000000 HC RX 637 (ALT 250 FOR IP): Performed by: STUDENT IN AN ORGANIZED HEALTH CARE EDUCATION/TRAINING PROGRAM

## 2024-10-18 PROCEDURE — 6370000000 HC RX 637 (ALT 250 FOR IP): Performed by: HOSPITALIST

## 2024-10-18 PROCEDURE — 6360000002 HC RX W HCPCS: Performed by: STUDENT IN AN ORGANIZED HEALTH CARE EDUCATION/TRAINING PROGRAM

## 2024-10-18 PROCEDURE — 99239 HOSP IP/OBS DSCHRG MGMT >30: CPT | Performed by: HOSPITALIST

## 2024-10-18 PROCEDURE — 94761 N-INVAS EAR/PLS OXIMETRY MLT: CPT

## 2024-10-18 PROCEDURE — 85025 COMPLETE CBC W/AUTO DIFF WBC: CPT

## 2024-10-18 PROCEDURE — 86038 ANTINUCLEAR ANTIBODIES: CPT

## 2024-10-18 PROCEDURE — 2580000003 HC RX 258: Performed by: STUDENT IN AN ORGANIZED HEALTH CARE EDUCATION/TRAINING PROGRAM

## 2024-10-18 PROCEDURE — 82787 IGG 1 2 3 OR 4 EACH: CPT

## 2024-10-18 RX ORDER — PANCRELIPASE 60000; 12000; 38000 [USP'U]/1; [USP'U]/1; [USP'U]/1
12000 CAPSULE, DELAYED RELEASE PELLETS ORAL
Qty: 270 CAPSULE | Refills: 0 | Status: SHIPPED | OUTPATIENT
Start: 2024-10-18 | End: 2024-10-18

## 2024-10-18 RX ORDER — THIAMINE MONONITRATE (VIT B1) 100 MG
100 TABLET ORAL DAILY
Qty: 30 TABLET | Refills: 0 | Status: SHIPPED | OUTPATIENT
Start: 2024-10-19 | End: 2024-10-18

## 2024-10-18 RX ORDER — PANCRELIPASE 60000; 12000; 38000 [USP'U]/1; [USP'U]/1; [USP'U]/1
12000 CAPSULE, DELAYED RELEASE PELLETS ORAL
Qty: 270 CAPSULE | Refills: 0 | Status: SHIPPED | OUTPATIENT
Start: 2024-10-18

## 2024-10-18 RX ORDER — MULTIVITAMIN WITH IRON
1 TABLET ORAL DAILY
Qty: 30 TABLET | Refills: 0 | Status: SHIPPED | OUTPATIENT
Start: 2024-10-19 | End: 2024-10-18

## 2024-10-18 RX ORDER — MULTIVITAMIN WITH IRON
1 TABLET ORAL DAILY
Qty: 30 TABLET | Refills: 0 | Status: SHIPPED | OUTPATIENT
Start: 2024-10-19

## 2024-10-18 RX ORDER — THIAMINE MONONITRATE (VIT B1) 100 MG
100 TABLET ORAL DAILY
Qty: 30 TABLET | Refills: 0 | Status: SHIPPED | OUTPATIENT
Start: 2024-10-19

## 2024-10-18 RX ORDER — AMLODIPINE BESYLATE 5 MG/1
5 TABLET ORAL DAILY
Qty: 30 TABLET | Refills: 0 | Status: SHIPPED | OUTPATIENT
Start: 2024-10-18

## 2024-10-18 RX ADMIN — MORPHINE SULFATE 4 MG: 4 INJECTION, SOLUTION INTRAMUSCULAR; INTRAVENOUS at 10:21

## 2024-10-18 RX ADMIN — Medication 100 MG: at 09:37

## 2024-10-18 RX ADMIN — SODIUM CHLORIDE, PRESERVATIVE FREE 10 ML: 5 INJECTION INTRAVENOUS at 09:38

## 2024-10-18 RX ADMIN — THERA TABS 1 TABLET: TAB at 09:37

## 2024-10-18 ASSESSMENT — PAIN DESCRIPTION - DIRECTION
RADIATING_TOWARDS: DENIES
RADIATING_TOWARDS: DENIES

## 2024-10-18 ASSESSMENT — PAIN DESCRIPTION - ONSET
ONSET: AWAKENED FROM SLEEP
ONSET: AWAKENED FROM SLEEP

## 2024-10-18 ASSESSMENT — PAIN DESCRIPTION - DESCRIPTORS
DESCRIPTORS: STABBING
DESCRIPTORS: ACHING

## 2024-10-18 ASSESSMENT — PAIN DESCRIPTION - FREQUENCY
FREQUENCY: INTERMITTENT
FREQUENCY: INTERMITTENT

## 2024-10-18 ASSESSMENT — PAIN SCALES - GENERAL
PAINLEVEL_OUTOF10: 0
PAINLEVEL_OUTOF10: 3
PAINLEVEL_OUTOF10: 0
PAINLEVEL_OUTOF10: 7
PAINLEVEL_OUTOF10: 0

## 2024-10-18 ASSESSMENT — PAIN DESCRIPTION - LOCATION
LOCATION: ABDOMEN
LOCATION: ABDOMEN

## 2024-10-18 ASSESSMENT — PAIN DESCRIPTION - PAIN TYPE
TYPE: ACUTE PAIN
TYPE: ACUTE PAIN

## 2024-10-18 ASSESSMENT — PAIN - FUNCTIONAL ASSESSMENT
PAIN_FUNCTIONAL_ASSESSMENT: ACTIVITIES ARE NOT PREVENTED
PAIN_FUNCTIONAL_ASSESSMENT: ACTIVITIES ARE NOT PREVENTED

## 2024-10-18 ASSESSMENT — PAIN DESCRIPTION - ORIENTATION
ORIENTATION: RIGHT
ORIENTATION: RIGHT

## 2024-10-18 NOTE — PROGRESS NOTES
4 Eyes Skin Assessment     NAME:  Misael Plascencia  YOB: 1970  MEDICAL RECORD NUMBER:  [id number]    The patient is being assessed for  Shift Handoff    I agree that at least one RN has performed a thorough Head to Toe Skin Assessment on the patient. ALL assessment sites listed below have been assessed.      Areas assessed by both nurses:    Head, Face, Ears, Shoulders, Back, Chest, Arms, Elbows, Hands, Sacrum. Buttock, Coccyx, Ischium, and Legs. Feet and Heels        Does the Patient have a Wound? No noted wound(s)       Jr Prevention initiated by RN: Yes  Wound Care Orders initiated by RN: No    Pressure Injury (Stage 3,4, Unstageable, DTI, NWPT, and Complex wounds) if present, place Wound referral order by RN under : No    New Ostomies, if present place, Ostomy referral order under : No     Nurse 1 eSignature: Electronically signed by Lara Mcintyre RN on 10/18/24 at 6:59 AM EDT    **SHARE this note so that the co-signing nurse can place an eSignature**    Nurse 2 eSignature: {Esignature:[id number]}

## 2024-10-18 NOTE — PROGRESS NOTES
\"DBILI\", \"TBIL\"       Coags   Recent Labs     10/16/24  0000 10/16/24  0911   APTT 31.9 53.4*               GALLITO Lebron - SAMANTHA    [facility].  [email or web address]  Phone: [phone]  Pager: [phone]

## 2024-10-18 NOTE — CARE COORDINATION
Patient dressed and ready to go home since Am  Pt Notified there was no discharge order in chart,  waiting for MD to see him  At 1330 Unable to find patient in his room or around the unit   Security notified  and CM from trauma team  Patient requested for medication assistance due to no insurance. CM informed Dr Weston and prescriptions were sent to [facility].       CM was informed by bedside RN that patient did not want to wait for his medications and left to go pick them up at home pharmacy. CM informed [facility] of patient leaving and sent alicia form to pharmacy incase patient returns for medications. CM called patients Misael Plascencia and patient stated he will come pack to the hospital to  medications from [facility].    Sally Verduzco, MSN, RN  Care Manager    [place] [address]  Office: [phone]  Fax: [phone]     [facility] Care Managers are on-call evenings from 4:30 pm until 7:00 pm. After 7:00 pm, please contact Nurse’s Admin at ext. [phone] for LYFT rides only.

## 2024-10-18 NOTE — PLAN OF CARE
Problem: Pain  Goal: Verbalizes/displays adequate comfort level or baseline comfort level  10/18/2024 1243 by Klaus Reveles RN  Outcome: Adequate for Discharge  10/17/2024 2347 by Lara Mcintyre RN  Outcome: Progressing     Problem: Safety - Adult  Goal: Free from fall injury  10/18/2024 1243 by Klaus Reveles RN  Outcome: Adequate for Discharge  Flowsheets (Taken 10/18/2024 0800)  Free From Fall Injury: Instruct family/caregiver on patient safety  10/17/2024 2347 by Lara Mcintyre RN  Outcome: Progressing     Problem: ABCDS Injury Assessment  Goal: Absence of physical injury  10/18/2024 1243 by Klaus Reveles RN  Outcome: Adequate for Discharge  Flowsheets (Taken 10/18/2024 0800)  Absence of Physical Injury: Implement safety measures based on patient assessment  10/17/2024 2347 by Lara Mcintyre RN  Outcome: Progressing     Problem: Hematologic - Adult  Goal: Maintains hematologic stability  Outcome: Adequate for Discharge  Flowsheets (Taken 10/18/2024 0800)  Maintains hematologic stability: Assess for signs and symptoms of bleeding or hemorrhage     Problem: Nutrition Deficit:  Goal: Optimize nutritional status  10/18/2024 1243 by Klaus Reveles RN  Outcome: Adequate for Discharge  10/17/2024 2347 by Lara Mcintyre RN  Outcome: Progressing

## 2024-10-18 NOTE — PROGRESS NOTES
Patient seen evaluated, lying in bed, no acute distress.  Patient is at baseline, symptoms have improved significantly.  Patient is tolerating diet.  GI input appreciated, patient is being discharged home today, is advised to follow-up with GI in 2 weeks.  Will discharge on Creon.  Patient has been counseled to stop drinking alcohol completely.  Patient verbalized understanding.

## 2024-10-18 NOTE — PLAN OF CARE
Patient is progressing.  Problem: Pain  Goal: Verbalizes/displays adequate comfort level or baseline comfort level  Outcome: Progressing     Problem: Safety - Adult  Goal: Free from fall injury  Outcome: Progressing     Problem: ABCDS Injury Assessment  Goal: Absence of physical injury  Outcome: Progressing     Problem: Nutrition Deficit:  Goal: Optimize nutritional status  Outcome: Progressing

## 2024-10-18 NOTE — PROGRESS NOTES
Infectious Disease progress Note        Reason: Necrotizing pancreatitis, gram-positive bacteremia    Current abx Prior abx   Vancomycin 10/17-10/18 Zosyn on 10/15     Lines:       Assessment :   54 y.o. who male  has a past medical history of Hypertension, Pancreatitis, and Thromboembolus (HCC), appendix perforation with subsequent surgical washout and partial pancreatectomy and partial splenectomy (few years ago at [facility]) presented to [facility] on 10/15/24  with back pain.     Clinical presentation consistent with acute necrotizing pancreatitis, gram-positive bacteremia    GI evaluation appreciated.  Currently no clinical or radiological evidence to suggest infected pancreatic necrosis.  Improving leukocytosis off antibiotics, lack of nausea/vomiting, lack of fevers would argue against this.    1/2 sets of blood cultures positive for coagulase-negative Staphylococcus in blood culture 10/15-likely contaminant.  No definite evidence of skin/soft tissue infection as a source of gram-positive bacteremia.    Remains clinically stable off antibiotics.  Afebrile.  No worsening abdominal pain/tenderness    Recommendations:    Hold further antibiotics antibiotics  Monitor for infected  pancreatic necrosis.  Follow-up GI recommendations.  Monitor clinically    Above plan was discussed in details with patient, dr. Weston . Please call me if any further questions or concerns. Will continue to participate in the care of this patient.  HPI:    Feels better.  Improved abdominal pain.  Patient denies any fever, chills throughout this time.       .      Past Medical History:   Diagnosis Date    Hypertension     Pancreatitis     Thromboembolus (HCC)        Past Surgical History:   Procedure Laterality Date    APPENDECTOMY      GI         [unfilled]    Current Facility-Administered Medications   Medication Dose Route Frequency    rivaroxaban (XARELTO) tablet 20 mg  20 mg Oral Daily    nicotine (NICODERM CQ) 21 MG/24HR 1 patch  1  scar on the abdomen.+ Upper abdominal tenderness, no guarding/rigidity   Genitourinary:  deferred   Extremities:   no clubbing, cyanosis; no joint effusions or swelling; Full ROM of all large joints to the upper and lower extremities; muscle mass appropriate for age   Neurologic:  No gross focal sensory abnormalities; 5/5 muscle strength to upper and lower extremities. Speech appropriate. Cranial nerves intact                        Skin:  No rash or ulcers noted   Back:  no spinal or paraspinal muscle tenderness or rigidity, no CVA tenderness     Psychiatric:  No suicidal or homicidal ideations, appropriate mood and affect         Labs: Results:   Chemistry Recent Labs     10/16/24  0911 10/17/24  0453 10/18/24  0240   GLUCOSE 93 79 109*    136 137   K 4.0 4.2 3.9    105 104   CO2 25 28 26   BUN 7 7 8   CREATININE 0.68 0.56* 0.65   GLOB 2.8 2.9 2.7   ALT 18 15* 16   AST 15 10 13      CBC w/Diff Recent Labs     10/16/24  0911 10/17/24  0453 10/18/24  0240   WBC 9.5 7.6 8.0   RBC 4.05* 3.87* 3.96*   HGB 10.1* 9.7* 9.9*   HCT 30.5* 29.2* 29.7*    361 364      Microbiology Results       Procedure Component Value Units Date/Time    Blood Culture 2 [[id number]] Collected: 10/15/24 1830    Order Status: Completed Specimen: Blood Updated: 10/18/24 0650     Special Requests NO SPECIAL REQUESTS        Culture NO GROWTH 3 DAYS       Blood Culture 1 [[id number]]  (Abnormal) Collected: 10/15/24 1820    Order Status: Completed Specimen: Blood Updated: 10/17/24 1603     Special Requests NO SPECIAL REQUESTS        Gram Stain       AEROBIC BOTTLE Gram positive cocci IN GROUPS                  SMEAR CALLED TO AND CORRECTLY REPEATED BY: MAT KONG RN 4N ON [id number] AT 1100 TO KNE           Culture       PROBABLE STAPHYLOCOCCUS SPECIES GROWING IN 1 OF 1 BOTTLES DRAWN No Site Indicated          Culture, Blood, PCR ID Panel [[id number]]  (Abnormal) Collected: 10/15/24 1820    Order Status: Completed Specimen: Blood

## 2024-10-18 NOTE — PROGRESS NOTES
Prepared patient for discharge. PIV removed, AVS given. Patient and his wife verbalized understanding.This nurse instructed him to stay while this nurse will verify if he is cleared from the 's standpoint. Patient said  he cannot wait and he said he is going to leave now. Patient left with his wife in good disposition, AOx4 on room air, ambulatory. No other complaints made.

## 2024-10-18 NOTE — DISCHARGE SUMMARY
Hospitalist Discharge Summary      Patient: Misael Plascencia MRN: [id number]  CSN: [id number]    YOB: 1970  Age: 54 y.o.  Sex: male    DOA: 10/15/2024 LOS:  LOS: 3 days   Discharge Date:     Admission Diagnoses: Generalized abdominal pain [R10.84]  Acute recurrent pancreatitis [K85.90]  Chest pain, unspecified type [R07.9]  Acute pancreatitis, unspecified complication status, unspecified pancreatitis type [K85.90]    Discharge Diagnoses:    Acute on chronic pancreatitis  Chronic smoker  Alcohol use  History of DVT right femoral vein 2022  Noncompliant with medications    Discharge Condition: Fair    Discharge To: Home    CODE STATUS: Full Code         PHYSICAL EXAM  Visit Vitals  BP (!) 164/95   Pulse 89   Temp 98.3 °F (36.8 °C) (Oral)   Resp 20   Ht 1.803 m (5' 10.98\")   Wt 54.7 kg (120 lb 8 oz)   SpO2 100%   BMI 16.81 kg/m²       General: Alert, cooperative, no acute distress    Lungs:  CTA Bilaterally. No Wheezing/Rhonchi/Rales.  Heart:  Regular rate and Rhythm.  Abdomen: Soft, Non distended, Non tender. + Bowel sounds.  Extremities: No edema/ cyanosis/ clubbing  Neurologic:  AA oriented X 3. Moves all extremities.                                HPI:    Misael Plascencia is a 54 y.o. who male  has a past medical history of Hypertension, Pancreatitis, and Thromboembolus (HCC)..  He presented with back pain.  Patient states her last several days he has had low back pain/mid back pain with radiation to his abdomen and now up to his chest.  He took 2 Tylenol's at home with no relief.  He rates the pain a 7 out of 10 and morphine helps take the edge off.  He also has a history of DVT for which he was taking Xarelto approximately 1 month ago until he states he could no longer afford it.  States he is taking a baby aspirin daily.  States he has this pain intermittently and has not improved with heating pads and rest.  He has noted some shortness of breath.  States that he did lower himself/fall to the ground at

## 2024-10-19 LAB — IGG4 SER-MCNC: 45 MG/DL (ref 2–96)

## 2024-10-20 LAB
ANA TITR SER IF: NORMAL {TITER}
BACTERIA SPEC CULT: NORMAL
LABORATORY COMMENT REPORT: NORMAL
SERVICE CMNT-IMP: NORMAL

## 2024-10-21 LAB
ANA SPECKLED TITR SER: NORMAL {TITER}
ANA TITR SER IF: POSITIVE
BACTERIA SPEC CULT: NORMAL
CENTROMERE B AB SER-ACNC: <0.2 AI (ref 0–0.9)
CHROMATIN AB SERPL-ACNC: 0.2 AI (ref 0–0.9)
DSDNA AB SER-ACNC: 1 IU/ML (ref 0–9)
ENA JO1 AB SER-ACNC: <0.2 AI (ref 0–0.9)
ENA RNP AB SER-ACNC: <0.2 AI (ref 0–0.9)
ENA SCL70 AB SER-ACNC: <0.2 AI (ref 0–0.9)
ENA SM AB SER-ACNC: <0.2 AI (ref 0–0.9)
ENA SS-A AB SER-ACNC: <0.2 AI (ref 0–0.9)
ENA SS-B AB SER-ACNC: <0.2 AI (ref 0–0.9)
LABORATORY COMMENT REPORT: ABNORMAL
Lab: NORMAL
NOTE: NORMAL
SERVICE CMNT-IMP: NORMAL

## 2024-10-31 NOTE — ED TRIAGE NOTES
Pt presents to ed via ems with c/o chest pain radiating from chest and down right leg and back since last night. Pt was given 324 mg aspirin by ems along with 2 nitro tabs 0.4mg with relief from 10/10 to 7/10.     Pt has hx blood clots and takes xarelto. Hx appendectomy       Yes